# Patient Record
Sex: MALE | Race: WHITE | NOT HISPANIC OR LATINO | Employment: FULL TIME | URBAN - METROPOLITAN AREA
[De-identification: names, ages, dates, MRNs, and addresses within clinical notes are randomized per-mention and may not be internally consistent; named-entity substitution may affect disease eponyms.]

---

## 2023-03-23 LAB
ALANINE AMINOTRANSFERASE (SGPT) (U/L) IN SER/PLAS: 31 U/L (ref 10–52)
ALBUMIN (G/DL) IN SER/PLAS: 4.2 G/DL (ref 3.4–5)
ALKALINE PHOSPHATASE (U/L) IN SER/PLAS: 53 U/L (ref 33–120)
ALPHA 1 ANTITRYPSIN (MG/DL) IN SER/PLAS: 142 MG/DL (ref 84–218)
ANION GAP IN SER/PLAS: 14 MMOL/L (ref 10–20)
ASPARTATE AMINOTRANSFERASE (SGOT) (U/L) IN SER/PLAS: 21 U/L (ref 9–39)
BASOPHILS (10*3/UL) IN BLOOD BY AUTOMATED COUNT: 0.03 X10E9/L (ref 0–0.1)
BASOPHILS/100 LEUKOCYTES IN BLOOD BY AUTOMATED COUNT: 0.4 % (ref 0–2)
BILIRUBIN TOTAL (MG/DL) IN SER/PLAS: 0.6 MG/DL (ref 0–1.2)
C REACTIVE PROTEIN (MG/L) IN SER/PLAS BY HIGH SENSIT: 0.9 MG/L
CALCIDIOL (25 OH VITAMIN D3) (NG/ML) IN SER/PLAS: 34 NG/ML
CALCIUM (MG/DL) IN SER/PLAS: 9 MG/DL (ref 8.6–10.3)
CARBON DIOXIDE, TOTAL (MMOL/L) IN SER/PLAS: 26 MMOL/L (ref 21–32)
CHLORIDE (MMOL/L) IN SER/PLAS: 101 MMOL/L (ref 98–107)
CHOLESTEROL (MG/DL) IN SER/PLAS: 144 MG/DL (ref 0–199)
CHOLESTEROL IN HDL (MG/DL) IN SER/PLAS: 30 MG/DL
CHOLESTEROL/HDL RATIO: 4.8
COBALAMIN (VITAMIN B12) (PG/ML) IN SER/PLAS: 298 PG/ML (ref 211–911)
CREATININE (MG/DL) IN SER/PLAS: 0.91 MG/DL (ref 0.5–1.3)
EOSINOPHILS (10*3/UL) IN BLOOD BY AUTOMATED COUNT: 0.16 X10E9/L (ref 0–0.7)
EOSINOPHILS/100 LEUKOCYTES IN BLOOD BY AUTOMATED COUNT: 2.1 % (ref 0–6)
ERYTHROCYTE DISTRIBUTION WIDTH (RATIO) BY AUTOMATED COUNT: 13.1 % (ref 11.5–14.5)
ERYTHROCYTE MEAN CORPUSCULAR HEMOGLOBIN CONCENTRATION (G/DL) BY AUTOMATED: 32.4 G/DL (ref 32–36)
ERYTHROCYTE MEAN CORPUSCULAR VOLUME (FL) BY AUTOMATED COUNT: 84 FL (ref 80–100)
ERYTHROCYTES (10*6/UL) IN BLOOD BY AUTOMATED COUNT: 5.42 X10E12/L (ref 4.5–5.9)
ESTIMATED AVERAGE GLUCOSE FOR HBA1C: 171 MG/DL
FERRITIN (UG/LL) IN SER/PLAS: 20 UG/L (ref 20–300)
GFR MALE: >90 ML/MIN/1.73M2
GLUCOSE (MG/DL) IN SER/PLAS: 150 MG/DL (ref 74–99)
HEMATOCRIT (%) IN BLOOD BY AUTOMATED COUNT: 45.4 % (ref 41–52)
HEMOGLOBIN (G/DL) IN BLOOD: 14.7 G/DL (ref 13.5–17.5)
HEMOGLOBIN A1C/HEMOGLOBIN TOTAL IN BLOOD: 7.6 %
IMMATURE GRANULOCYTES/100 LEUKOCYTES IN BLOOD BY AUTOMATED COUNT: 0.5 % (ref 0–0.9)
INSULIN, FASTING: 19 UIU/ML (ref 3–25)
IRON (UG/DL) IN SER/PLAS: 48 UG/DL (ref 35–150)
IRON BINDING CAPACITY (UG/DL) IN SER/PLAS: 456 UG/DL (ref 240–445)
IRON SATURATION (%) IN SER/PLAS: 11 % (ref 25–45)
LDL: 59 MG/DL (ref 0–99)
LEUKOCYTES (10*3/UL) IN BLOOD BY AUTOMATED COUNT: 7.7 X10E9/L (ref 4.4–11.3)
LYMPHOCYTES (10*3/UL) IN BLOOD BY AUTOMATED COUNT: 2.04 X10E9/L (ref 1.2–4.8)
LYMPHOCYTES/100 LEUKOCYTES IN BLOOD BY AUTOMATED COUNT: 26.5 % (ref 13–44)
MAGNESIUM (MG/DL) IN SER/PLAS: 2 MG/DL (ref 1.6–2.4)
MONOCYTES (10*3/UL) IN BLOOD BY AUTOMATED COUNT: 0.64 X10E9/L (ref 0.1–1)
MONOCYTES/100 LEUKOCYTES IN BLOOD BY AUTOMATED COUNT: 8.3 % (ref 2–10)
NEUTROPHILS (10*3/UL) IN BLOOD BY AUTOMATED COUNT: 4.79 X10E9/L (ref 1.2–7.7)
NEUTROPHILS/100 LEUKOCYTES IN BLOOD BY AUTOMATED COUNT: 62.2 % (ref 40–80)
NON HDL CHOLESTEROL: 114 MG/DL
PLATELETS (10*3/UL) IN BLOOD AUTOMATED COUNT: 184 X10E9/L (ref 150–450)
POTASSIUM (MMOL/L) IN SER/PLAS: 3.9 MMOL/L (ref 3.5–5.3)
PROSTATE SPECIFIC AG (NG/ML) IN SER/PLAS: 0.29 NG/ML (ref 0–4)
PROTEIN TOTAL: 6.5 G/DL (ref 6.4–8.2)
SODIUM (MMOL/L) IN SER/PLAS: 137 MMOL/L (ref 136–145)
THYROTROPIN (MIU/L) IN SER/PLAS BY DETECTION LIMIT <= 0.05 MIU/L: 2.37 MIU/L (ref 0.44–3.98)
TRIGLYCERIDE (MG/DL) IN SER/PLAS: 273 MG/DL (ref 0–149)
URATE (MG/DL) IN SER/PLAS: 5 MG/DL (ref 4–7.5)
UREA NITROGEN (MG/DL) IN SER/PLAS: 17 MG/DL (ref 6–23)
VLDL: 55 MG/DL (ref 0–40)

## 2023-03-27 LAB — LIPOPROTEIN A SER/PLAS: <6 MG/DL

## 2023-03-28 DIAGNOSIS — Z00.00 HEALTHCARE MAINTENANCE: ICD-10-CM

## 2023-03-29 LAB
TESTOSTERONE FREE (CHAN): 124.1 PG/ML (ref 35–155)
TESTOSTERONE,TOTAL,LC-MS/MS: 564 NG/DL (ref 250–1100)

## 2023-05-25 LAB
CERULOPLASMIN (MG/DL) IN SER/PLAS: 26 MG/DL (ref 20–60)
HEPATITIS A TOTAL AB INTERPRETATION: REACTIVE
HEPATITIS B VIRUS SURFACE AB (MIU/ML) IN SERUM: 8.9 MIU/ML
HEPATITIS B VIRUS SURFACE AG PRESENCE IN SERUM: NONREACTIVE
HEPATITIS C VIRUS AB PRESENCE IN SERUM: NONREACTIVE

## 2023-05-30 LAB
ALPHA-1 ANTITRYPSIN PHENOTYPE: NORMAL
ALPHA-1-ANTITRYPSIN (REF): 142 MG/DL (ref 90–200)

## 2023-06-06 DIAGNOSIS — K76.0 FATTY LIVER DISEASE, NONALCOHOLIC: ICD-10-CM

## 2023-06-30 ENCOUNTER — TELEPHONE (OUTPATIENT)
Dept: PRIMARY CARE | Facility: CLINIC | Age: 58
End: 2023-06-30

## 2023-07-14 LAB
ALANINE AMINOTRANSFERASE (SGPT) (U/L) IN SER/PLAS: 34 U/L (ref 10–52)
ALBUMIN (G/DL) IN SER/PLAS: 4.6 G/DL (ref 3.4–5)
ALBUMIN (MG/L) IN URINE: <7 MG/L
ALBUMIN/CREATININE (UG/MG) IN URINE: NORMAL UG/MG CRT (ref 0–30)
ALKALINE PHOSPHATASE (U/L) IN SER/PLAS: 57 U/L (ref 33–120)
ANION GAP IN SER/PLAS: 15 MMOL/L (ref 10–20)
ASPARTATE AMINOTRANSFERASE (SGOT) (U/L) IN SER/PLAS: 22 U/L (ref 9–39)
BILIRUBIN TOTAL (MG/DL) IN SER/PLAS: 0.7 MG/DL (ref 0–1.2)
CALCIUM (MG/DL) IN SER/PLAS: 9.7 MG/DL (ref 8.6–10.6)
CARBON DIOXIDE, TOTAL (MMOL/L) IN SER/PLAS: 27 MMOL/L (ref 21–32)
CHLORIDE (MMOL/L) IN SER/PLAS: 101 MMOL/L (ref 98–107)
CHOLESTEROL (MG/DL) IN SER/PLAS: 164 MG/DL (ref 0–199)
CHOLESTEROL IN HDL (MG/DL) IN SER/PLAS: 35.8 MG/DL
CHOLESTEROL/HDL RATIO: 4.6
CREATININE (MG/DL) IN SER/PLAS: 0.81 MG/DL (ref 0.5–1.3)
CREATININE (MG/DL) IN URINE: 37 MG/DL (ref 20–370)
GFR MALE: >90 ML/MIN/1.73M2
GLUCOSE (MG/DL) IN SER/PLAS: 166 MG/DL (ref 74–99)
LDL: 72 MG/DL (ref 0–99)
NON HDL CHOLESTEROL: 128 MG/DL
POTASSIUM (MMOL/L) IN SER/PLAS: 4 MMOL/L (ref 3.5–5.3)
PROTEIN TOTAL: 7.2 G/DL (ref 6.4–8.2)
SODIUM (MMOL/L) IN SER/PLAS: 139 MMOL/L (ref 136–145)
TRIGLYCERIDE (MG/DL) IN SER/PLAS: 283 MG/DL (ref 0–149)
UREA NITROGEN (MG/DL) IN SER/PLAS: 18 MG/DL (ref 6–23)
VLDL: 57 MG/DL (ref 0–40)

## 2023-07-18 LAB
CHOLESTEROL, TOTAL(NMR): 173 MG/DL (ref 100–199)
HDL-C(NMR): 35 MG/DL
HDL-P (TOTAL)(NMR): 32.5 UMOL/L
LDL AND HDL PARTICLES -DATA CONVERSION: ABNORMAL
LDL SIZE(NMR): 19.7 NM
LDL-C (NIH CALC)(NMR): 94 MG/DL (ref 0–99)
LDL-P(NMR): 1341 NMOL/L
LIPOPROTEIN A: <6 MG/DL
LP-IR SCORE(NMR): 87
SMALL LDL-P(NMR): 1036 NMOL/L
TRIGLYCERIDES(NMR): 260 MG/DL (ref 0–149)

## 2023-09-26 PROBLEM — E66.01 MORBID OBESITY (MULTI): Status: ACTIVE | Noted: 2023-09-26

## 2023-09-26 PROBLEM — G47.33 OBSTRUCTIVE SLEEP APNEA: Status: ACTIVE | Noted: 2023-09-26

## 2023-09-26 PROBLEM — I10 HIGH BLOOD PRESSURE: Status: ACTIVE | Noted: 2023-09-26

## 2023-09-26 PROBLEM — K74.60 CIRRHOSIS OF LIVER (MULTI): Status: ACTIVE | Noted: 2023-09-26

## 2023-09-26 PROBLEM — I50.30 (HFPEF) HEART FAILURE WITH PRESERVED EJECTION FRACTION (MULTI): Status: ACTIVE | Noted: 2023-09-26

## 2023-09-26 PROBLEM — M26.69 TMJ INFLAMMATION: Status: ACTIVE | Noted: 2023-09-26

## 2023-09-26 PROBLEM — R79.89 LOW SERUM TESTOSTERONE: Status: ACTIVE | Noted: 2023-09-26

## 2023-09-26 PROBLEM — K76.0 FATTY LIVER: Status: ACTIVE | Noted: 2023-09-26

## 2023-09-26 PROBLEM — E11.9 DM TYPE 2 (DIABETES MELLITUS, TYPE 2) (MULTI): Status: ACTIVE | Noted: 2023-09-26

## 2023-09-26 RX ORDER — FLUTICASONE FUROATE AND VILANTEROL TRIFENATATE 100; 25 UG/1; UG/1
1 POWDER RESPIRATORY (INHALATION) DAILY
COMMUNITY
Start: 2022-11-03

## 2023-09-26 RX ORDER — AMLODIPINE AND VALSARTAN 10; 160 MG/1; MG/1
1 TABLET ORAL DAILY
COMMUNITY
Start: 2023-08-12 | End: 2023-11-03 | Stop reason: WASHOUT

## 2023-09-26 RX ORDER — OMEPRAZOLE 20 MG/1
CAPSULE, DELAYED RELEASE ORAL
COMMUNITY
End: 2023-11-03 | Stop reason: WASHOUT

## 2023-09-26 RX ORDER — FUROSEMIDE 40 MG/1
TABLET ORAL
COMMUNITY
End: 2023-11-03 | Stop reason: WASHOUT

## 2023-09-26 RX ORDER — MELATONIN 10 MG
CAPSULE ORAL
COMMUNITY
End: 2023-11-03 | Stop reason: WASHOUT

## 2023-09-26 RX ORDER — LOSARTAN POTASSIUM 100 MG/1
100 TABLET ORAL DAILY
COMMUNITY
End: 2023-11-03 | Stop reason: WASHOUT

## 2023-09-26 RX ORDER — VALSARTAN 160 MG/1
160 TABLET ORAL DAILY
COMMUNITY
Start: 2023-09-08 | End: 2023-11-10 | Stop reason: ALTCHOICE

## 2023-09-26 RX ORDER — MULTIVIT-MIN/FA/LYCOPEN/LUTEIN .4-300-25
TABLET ORAL
COMMUNITY

## 2023-09-26 RX ORDER — BROMPHENIRAMINE MALEATE, PSEUDOEPHEDRINE HYDROCHLORIDE, AND DEXTROMETHORPHAN HYDROBROMIDE 2; 30; 10 MG/5ML; MG/5ML; MG/5ML
SYRUP ORAL
COMMUNITY
Start: 2022-12-27

## 2023-09-26 RX ORDER — CIPROFLOXACIN 0.5 MG/.25ML
SOLUTION/ DROPS AURICULAR (OTIC)
COMMUNITY
Start: 2022-01-19 | End: 2023-11-03 | Stop reason: WASHOUT

## 2023-09-26 RX ORDER — CETIRIZINE HYDROCHLORIDE 10 MG/1
1 TABLET ORAL DAILY
COMMUNITY
Start: 2023-01-07

## 2023-09-26 RX ORDER — OMEPRAZOLE 40 MG/1
CAPSULE, DELAYED RELEASE ORAL
COMMUNITY
Start: 2023-08-25

## 2023-09-26 RX ORDER — MONTELUKAST SODIUM 10 MG/1
10 TABLET ORAL DAILY
COMMUNITY
Start: 2023-05-28

## 2023-09-26 RX ORDER — HYDROXYZINE HYDROCHLORIDE 25 MG/1
25 TABLET, FILM COATED ORAL NIGHTLY PRN
COMMUNITY
Start: 2023-04-22 | End: 2023-11-03 | Stop reason: WASHOUT

## 2023-09-26 RX ORDER — ORAL SEMAGLUTIDE 14 MG/1
14 TABLET ORAL DAILY
COMMUNITY
Start: 2023-08-28 | End: 2023-11-03 | Stop reason: WASHOUT

## 2023-09-26 RX ORDER — METFORMIN HYDROCHLORIDE 500 MG/1
TABLET, EXTENDED RELEASE ORAL
COMMUNITY
Start: 2023-05-28 | End: 2023-11-03 | Stop reason: WASHOUT

## 2023-09-26 RX ORDER — ALUMINUM ZIRCONIUM OCTACHLOROHYDREX GLY 16 G/100G
GEL TOPICAL
COMMUNITY

## 2023-09-26 RX ORDER — ZINC SULFATE 50(220)MG
CAPSULE ORAL
COMMUNITY

## 2023-09-26 RX ORDER — EMPAGLIFLOZIN 25 MG/1
25 TABLET, FILM COATED ORAL DAILY
COMMUNITY
End: 2023-11-03 | Stop reason: WASHOUT

## 2023-09-26 RX ORDER — TRAZODONE HYDROCHLORIDE 50 MG/1
TABLET ORAL
COMMUNITY
Start: 2022-11-03 | End: 2023-11-03 | Stop reason: ALTCHOICE

## 2023-09-26 RX ORDER — DICYCLOMINE HYDROCHLORIDE 10 MG/1
CAPSULE ORAL
COMMUNITY
End: 2023-11-03 | Stop reason: WASHOUT

## 2023-09-26 RX ORDER — ONDANSETRON 4 MG/1
TABLET, FILM COATED ORAL
COMMUNITY

## 2023-09-26 RX ORDER — FUROSEMIDE 20 MG/1
40 TABLET ORAL DAILY
COMMUNITY
Start: 2023-08-26 | End: 2023-11-03 | Stop reason: WASHOUT

## 2023-09-26 RX ORDER — TESTOSTERONE 20.25 MG/1.25G
GEL TOPICAL
COMMUNITY
Start: 2023-05-26

## 2023-09-26 RX ORDER — DICYCLOMINE HYDROCHLORIDE 20 MG/1
20 TABLET ORAL 2 TIMES DAILY PRN
COMMUNITY
Start: 2023-09-01

## 2023-09-26 RX ORDER — ALBUTEROL SULFATE 90 UG/1
AEROSOL, METERED RESPIRATORY (INHALATION)
COMMUNITY

## 2023-09-26 RX ORDER — IBUPROFEN 100 MG/5ML
SUSPENSION, ORAL (FINAL DOSE FORM) ORAL
COMMUNITY

## 2023-09-26 RX ORDER — SPIRONOLACTONE 25 MG/1
25 TABLET ORAL DAILY
COMMUNITY
Start: 2023-09-08 | End: 2024-02-19 | Stop reason: SDUPTHER

## 2023-09-26 RX ORDER — VIT C/E/ZN/COPPR/LUTEIN/ZEAXAN 250MG-90MG
CAPSULE ORAL
COMMUNITY
End: 2023-11-03 | Stop reason: WASHOUT

## 2023-10-03 DIAGNOSIS — E11.9 TYPE 2 DIABETES MELLITUS WITHOUT COMPLICATION, WITHOUT LONG-TERM CURRENT USE OF INSULIN (MULTI): Primary | ICD-10-CM

## 2023-10-04 RX ORDER — TIRZEPATIDE 2.5 MG/.5ML
INJECTION, SOLUTION SUBCUTANEOUS
Qty: 2 ML | Refills: 0 | Status: SHIPPED | OUTPATIENT
Start: 2023-10-04 | End: 2023-11-03 | Stop reason: WASHOUT

## 2023-10-06 ENCOUNTER — TELEMEDICINE (OUTPATIENT)
Dept: BEHAVIORAL HEALTH | Facility: CLINIC | Age: 58
End: 2023-10-06
Payer: COMMERCIAL

## 2023-10-06 DIAGNOSIS — F43.23 ADJUSTMENT DISORDER WITH MIXED ANXIETY AND DEPRESSED MOOD: ICD-10-CM

## 2023-10-06 PROCEDURE — 90837 PSYTX W PT 60 MINUTES: CPT | Performed by: PSYCHOLOGIST

## 2023-10-07 NOTE — PROGRESS NOTES
"  8PM [address] [facility] Psych for Adjustment Dx, Stress (60 MIN, [phone])  Third session. Tough past week. Got COVID shortly after we last met, was working from home. First time got COVID, not feeling well, \"brain fog, headache, coughing.\" Wife also had it but was less symptomatic, having had before. Going to [place] early in morning for business trip. Hopes to have some down/rest time there. Initially wife was considering going, now going alone and given how feeling is OK with that. Didn't have time/energy to reach out to wife's family re. HETAL and to set record straight. HETAL still in rehab facility, would healing, will be going  to nursing home/memory care unit after recovery. Said HETAL's reputation and behavioral disruptions has prevented her from getting into a \"4 star\" facility, will be in \"2 star\" facility. She's reached out to him via text and he's managing distress of this, referring her back to guardian which court decided, especially over her finances. Has elected to take 5-6 months before he has face to face contact with her, somewhat of a reset. Daughter, Marilyn, talked to him. He'd like to see his kids visit HETAL but understands the oldest and youngest having some reservations/fears in visiting HETAL on her on, given her unpredictable behavior. Plans on cleaning out [facility] room, getting her stuff around to send to her new residence. Since they lived together so long, is liking cultivating own living space independent of MIL's influence (e.g., got new dining room table, gave old one that patient, wife, HETAL got together, to his daughter and WALDEMAR). Discussed wife's wanting more intimacy/affection. Patient discussed his lowered libido, has been on testosterone replacement patch for over a year. Also, hx of diabetes. Wife will be critical about lack of affection but then focuses complaint on specialized bed they have, saying she doesn't like it. Patient re-directs her appropriately but doesn't always lead to ok " "resolution. Schedules vary, patient's wife up until 1 or 2 am, patient in bed. Has tried being affectionate/loving when entering/leaving but wife rejecting saying she doesn't like to be kissed in am, wakes her. Difficult to assess extent to which residuals from TBI impact judgement/behavior of wife. Leaves patient feeling he can't do anything right at times. Still, able to see wife is committed to him and he loves her, even when difficult. Considering going away for a weekend. Wife's pain also impacting intimacy/wanting to be touched. Wife is incest survivor. \"Reactionary nature of her impulsiveness.\" Wife also has hx of depression and anxiety. Considering getting back to marriage enrichment through [employer] which they did in distant past and was helpful. Wife now Scientologist adverse. Kids (Erin, Marilyn, Bravo). Wife will be re-tested in Jan (neuropsych?, neurological?) to look at functioning. Next: 2 weeks.    "

## 2023-10-10 ENCOUNTER — PHARMACY VISIT (OUTPATIENT)
Dept: PHARMACY | Facility: CLINIC | Age: 58
End: 2023-10-10
Payer: COMMERCIAL

## 2023-10-10 PROCEDURE — RXMED WILLOW AMBULATORY MEDICATION CHARGE

## 2023-10-14 ENCOUNTER — PHARMACY VISIT (OUTPATIENT)
Dept: PHARMACY | Facility: CLINIC | Age: 58
End: 2023-10-14
Payer: COMMERCIAL

## 2023-10-14 PROCEDURE — RXMED WILLOW AMBULATORY MEDICATION CHARGE

## 2023-10-16 ENCOUNTER — APPOINTMENT (OUTPATIENT)
Dept: PHARMACY | Facility: HOSPITAL | Age: 58
End: 2023-10-16
Payer: COMMERCIAL

## 2023-10-20 ENCOUNTER — TELEMEDICINE (OUTPATIENT)
Dept: BEHAVIORAL HEALTH | Facility: CLINIC | Age: 58
End: 2023-10-20
Payer: COMMERCIAL

## 2023-10-20 DIAGNOSIS — F43.23 ADJUSTMENT DISORDER WITH MIXED ANXIETY AND DEPRESSED MOOD: ICD-10-CM

## 2023-10-20 PROCEDURE — 90837 PSYTX W PT 60 MINUTES: CPT | Performed by: PSYCHOLOGIST

## 2023-10-23 NOTE — PROGRESS NOTES
"6PM [address] [facility] Psych for Adjustment Dx, Stress (60 MIN, [phone])  Virtual. Supportive Therapy. Busy, five conferences over past month. 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 Discussed exchanges from family group text thread with him leaving on trip and indicating that might be delayed in airport with current [place] crisis. Wife is pro [employer], also follows [employer] conspiracy theories. At times is unsure how to handle her beliefs. Tries to validate her feelings/beliefs but asks for \"support/proof\" with sources attached. At times, will rely on distraction so she will not be upset. \"Don't know how to make an unreasonable person reasonable.\" Wife wants to get a gun, wants to stock up provisions, etc. Also had exchanges over wife's mother, wife upset about him moving stuff to memory care center, \"she went ape shit\". Able to say some stuff needed to get done but delayed moving stuff and will hire 2 men and a truck out of [employer] funds. Also, concerned with wife's criticism of him with intimacy. Wife's decision-making, thinking and emotional reactions stronger since head injury. 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 Wife also participated in PTSD program at [place], hx of being incest survivor. Hasn't been actively seeing a psychotherapist for several years. Thinking ahead, wonders about wife's competence re. Decision-making, mental health, judgements. Wants to retire at some point, prepare for future but a lot is up in the air. Wife will threaten divorce, \"goes back to hurt little girl\", didn't have positive role models for relationships in the past. Discussed his wondering how much responsibility he should take for her appts (e.g., driving OT appt to be scheduled). Often caught between kids and wife, tries to be respectful and supportive of wife but also understands how kids feel, not " feeling comfortable spending time with his wife when he's not there. Next: 2 weeks.

## 2023-10-27 ENCOUNTER — TELEMEDICINE (OUTPATIENT)
Dept: PHARMACY | Facility: HOSPITAL | Age: 58
End: 2023-10-27
Payer: COMMERCIAL

## 2023-10-27 DIAGNOSIS — E11.9 TYPE 2 DIABETES MELLITUS WITHOUT COMPLICATION, WITHOUT LONG-TERM CURRENT USE OF INSULIN (MULTI): Primary | ICD-10-CM

## 2023-10-27 ASSESSMENT — ENCOUNTER SYMPTOMS: VISUAL CHANGE: 1

## 2023-10-27 NOTE — ASSESSMENT & PLAN NOTE
Discussed new BP medication could be contributing to his changes in vision.   Patricia does not check BP at home.   Recommended Omron BP cuff for accurate readings.

## 2023-10-27 NOTE — ASSESSMENT & PLAN NOTE
Patient is tolerating mounjaro 5 mg once weekly. He would like to increase dose for cardiometabolic benefits, weight loss is becoming stagnant.   Discussed deferring dosage increase until cleared by ophthalmology (apt on 11/1) due to recent changes in vision.

## 2023-10-27 NOTE — PROGRESS NOTES
[email or web address]    Patient is sent at the request of Jacinto Willis MD for medication management.  My final recommendations will be communicated back to the requesting provider by way of shared medical record.    Subjective   Diabetes  He presents for his follow-up diabetic visit. He has type 2 diabetes mellitus. His disease course has been improving. Associated symptoms include visual change. (New onset, patient describes as dark spots at night and bright spots in the AM. F/U with ophthalmology scheduled on 11/1. Potential for this to be from recent change in BP meds. )       Allergies   Allergen Reactions    Tramadol Hives     Current monitoring regimen:   Patient is using: continuous glucose monitor    CGM Report: patient reports 92% time in range, no other data available during discussion    Adverse Effects:   - N/V when he eats peanut butter and jelly  - No other GI side effects reported    Objective     There were no vitals taken for this visit.    Diabetes Pharmacotherapy:    - synjardy XR 25/1000 mg: one tablet by mouth twice daily   - mounjaro 5 mg once weekly (Friday injections)     Historical Pharmacotherapy:   - rybelsus 14 mg once daily   - metformin 500 mg BID    Secondary Prevention:   - Statin? No  - ACE-I/ARB? Yes  - Aspirin? No    Pertinent PMH Review:  - PMH of Pancreatitis: No  - PMH of Retinopathy: No  - PMH of Urinary Tract Infections: No  - PMH of MTC: No    Labs:   Lab Results   Component Value Date    BILITOT 0.7 07/14/2023    CALCIUM 9.7 07/14/2023    CO2 27 07/14/2023     07/14/2023    CREATININE 0.81 07/14/2023    GLUCOSE 166 (H) 07/14/2023    ALKPHOS 57 07/14/2023    K 4.0 07/14/2023    PROT 7.2 07/14/2023     07/14/2023    AST 22 07/14/2023    ALT 34 07/14/2023    BUN 18 07/14/2023    ANIONGAP 15 07/14/2023    MG 2.00 03/23/2023    ALBUMIN 4.6 07/14/2023    GFRMALE >90 07/14/2023     Lab Results   Component Value Date    TRIG 283 (H) 07/14/2023    CHOL 164  07/14/2023    HDL 35.8 (A) 07/14/2023     Lab Results   Component Value Date    HGBA1C 7.6 (A) 03/23/2023    HGBA1C 7.1 (A) 07/21/2022    HGBA1C 8.2 (A) 01/19/2022     Assessment/Plan   Problem List Items Addressed This Visit       DM type 2 (diabetes mellitus, type 2) (CMS/[facility]) - Primary     Patients diabetes is stable with most recent A1c of 7.6% (Goal < 7%).   Continue synjardy and mounjaro.    Education Provided to Patient:   Defer dosage increase until approved by ophthalmology.   Start checking your BP with omron cuff.   Follow-up: I recommend diabetes care be re-addressed in 1 week to discuss potential for dosage increase of mounjaro.    Margaret Almanza, PharmD    Continue all meds under the continuation of care with the referring provider and clinical pharmacy team.

## 2023-10-30 DIAGNOSIS — E11.9 TYPE 2 DIABETES MELLITUS WITHOUT COMPLICATION, WITHOUT LONG-TERM CURRENT USE OF INSULIN (MULTI): Primary | ICD-10-CM

## 2023-11-01 ENCOUNTER — APPOINTMENT (OUTPATIENT)
Dept: BEHAVIORAL HEALTH | Facility: CLINIC | Age: 58
End: 2023-11-01
Payer: COMMERCIAL

## 2023-11-03 ENCOUNTER — PHARMACY VISIT (OUTPATIENT)
Dept: PHARMACY | Facility: CLINIC | Age: 58
End: 2023-11-03
Payer: COMMERCIAL

## 2023-11-03 ENCOUNTER — TELEMEDICINE (OUTPATIENT)
Dept: PHARMACY | Facility: HOSPITAL | Age: 58
End: 2023-11-03
Payer: COMMERCIAL

## 2023-11-03 ENCOUNTER — TELEMEDICINE (OUTPATIENT)
Dept: BEHAVIORAL HEALTH | Facility: CLINIC | Age: 58
End: 2023-11-03
Payer: COMMERCIAL

## 2023-11-03 DIAGNOSIS — E11.9 TYPE 2 DIABETES MELLITUS WITHOUT COMPLICATION, WITHOUT LONG-TERM CURRENT USE OF INSULIN (MULTI): Primary | ICD-10-CM

## 2023-11-03 DIAGNOSIS — F43.23 ADJUSTMENT DISORDER WITH MIXED ANXIETY AND DEPRESSED MOOD: ICD-10-CM

## 2023-11-03 PROCEDURE — RXMED WILLOW AMBULATORY MEDICATION CHARGE

## 2023-11-03 PROCEDURE — 90837 PSYTX W PT 60 MINUTES: CPT | Performed by: PSYCHOLOGIST

## 2023-11-03 RX ORDER — FAMOTIDINE 20 MG/1
20 TABLET, FILM COATED ORAL DAILY
COMMUNITY

## 2023-11-03 RX ORDER — ACETAMINOPHEN 500 MG
2000 TABLET ORAL DAILY
COMMUNITY

## 2023-11-03 ASSESSMENT — ENCOUNTER SYMPTOMS: VISUAL CHANGE: 1

## 2023-11-03 NOTE — Clinical Note
Patient is endorsing signs/symptoms of sinus infection, likely chronic and unrelated to medication therapy. I did not increase his Mounjaro to 7.5 mg today due to the likely start of another antibiotic. I believe at his visit on 11/10/2023 it would be appropriate to start 7.5 mg. He was still taking rybelsus 14 mg and advised to discontinue his therapy and dispose appopriately.

## 2023-11-03 NOTE — PROGRESS NOTES
[email or web address]    Patient is sent at the request of Jacinto Willis MD for medication management.  My final recommendations will be communicated back to the requesting provider by way of shared medical record.    Subjective   Diabetes  He presents for his follow-up diabetic visit. He has type 2 diabetes mellitus. His disease course has been improving. There are no hypoglycemic associated symptoms. Associated symptoms include visual change. Symptoms are stable. Risk factors for coronary artery disease include diabetes mellitus, hypertension, male sex and obesity. An ACE inhibitor/angiotensin II receptor blocker is being taken. Eye exam is current.     Allergies   Allergen Reactions    Tramadol Hives     Current BG monitoring regimen:   Patient is using: continuous glucose monitor    Current BP Monitoring Regimen:   - Has not purchased a BP monitor   - Discussed purchasing an Omron series 3 and 5 blood pressure monitor    Adverse Effects:   - N/V when he eats peanut butter and jelly  - No other GI side effects reported  - Patient reports nasal congestion, cough, and phlegm within his throat. Does not note any shortness of breath or wheezing. Covid presented and positive 9/17/2023 and out of work through 10/1/2023.   - Phlegm Production: Green/yellow/brown  - ENT appointment today (11/3/2023) at 9:30 am   - Doxycycline course completed approximately one week ago.     Objective     There were no vitals taken for this visit.    Diabetes Pharmacotherapy:    - Synjardy XR 25/1000 mg: one tablet by mouth once daily   - Mounjaro 5 mg once weekly on Fridays (Two doses remaining - One injection for today and 11/10/2023)     Historical Pharmacotherapy:   - Rybelsus 14 mg once daily - Patient reports as of 11/3/2023 that he is still taking Rybelsus 14 mg in combination with Mounjaro 5 mg)  - metformin 500 mg BID    Secondary Prevention:   - Statin? No  - ACE-I/ARB? Yes - Valsartan 160 mg   - Aspirin?  No    Pertinent PMH Review:  - PMH of Pancreatitis: No  - PMH of Retinopathy: No  - PMH of Urinary Tract Infections: No  - PMH of MTC: No    Labs:   Lab Results   Component Value Date    BILITOT 0.7 07/14/2023    CALCIUM 9.7 07/14/2023    CO2 27 07/14/2023     07/14/2023    CREATININE 0.81 07/14/2023    GLUCOSE 166 (H) 07/14/2023    ALKPHOS 57 07/14/2023    K 4.0 07/14/2023    PROT 7.2 07/14/2023     07/14/2023    AST 22 07/14/2023    ALT 34 07/14/2023    BUN 18 07/14/2023    ANIONGAP 15 07/14/2023    MG 2.00 03/23/2023    ALBUMIN 4.6 07/14/2023    GFRMALE >90 07/14/2023     Lab Results   Component Value Date    TRIG 283 (H) 07/14/2023    CHOL 164 07/14/2023    HDL 35.8 (A) 07/14/2023     Component      Latest Ref Rng 7/14/2023   LDL Calculated      0 - 99 mg/dL 72      Lab Results   Component Value Date    HGBA1C 7.6 (A) 03/23/2023    HGBA1C 7.1 (A) 07/21/2022    HGBA1C 8.2 (A) 01/19/2022     Component      Latest Ref Rng 7/14/2023   ALBUMIN (MG/L) IN URINE      Not Established mg/L <7.0    Albumin/Creatine Ratio      0.0 - 30.0 ug/mg crt SEE COMMENT    Creatinine, Urine Random      20.0 - 370.0 mg/dL 37.0      Health Maintenance:   Eye Exam: 11/1/2023 - No noted acute or chronic changes. Patient okay'd for Mounjaro dose increase  Lipid Panel: LDL - 72 mg/dL (Goal < 70 mg/dL)  Urine Albumin: WNL - 7/14/2023    Assessment/Plan   Problem List Items Addressed This Visit       DM type 2 (diabetes mellitus, type 2) (CMS/[facility]) - Primary     Patients diabetes is stable with most recent A1c of 7.6% (Goal < 7%).   Continue:  Synjardy XR 25/1000 mg: Take one tablet by mouth once daily  Mounjaro 5 mg: Inject 5 mg under the skin once weekly  Stop:  Rybelsus 14 mg - Do not take this medication in combination with Mounjaro 5 mg. Advised to dispose of this medication.   Medication list and allergy list reconciled during this encounter   Education Provided to Patient: Disposal of Rybelsus 14 mg and continuation of  Mounjaro/Synjardy   Therapy dosage increase with Mounjaro approved by opthalmology as it was reported patient is experiencing age related changes.  Omron Series 3 and 5 recommended for purchase for blood pressure monitoring. As of today he not started monitoring his BP at home.   Follow-up: 11/10/2023  [facility] follow up: 11/10/2023     Yaron Olivier, PharmD    Continue all meds under the continuation of care with the referring provider and clinical pharmacy team.

## 2023-11-04 NOTE — PROGRESS NOTES
"8PM [address] [facility] for Adjustment Dx, Stress (60 MIN, 805-910)  Virtual. Problems with epic/[employer] virtual platform. Supportive/CBT.  Able to start making home the way he likes, removing HETAL's stuff, getting some furniture to make living space nicer, 5 rooms. Hurt forearm, may get MRI. Did see HETAL and redirected her complaints to her guardian . Will help unpack some more stuff then taking \"[place] vacation.\" Hopes to re-calibrate, taking 6 week break from her. \"Trying to integrate wife back into mix with mom.\" She's also upset with her mother. 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 Discussed levels of prioritization with work-1 elected officials, donors  then regulators, 2 board members, 3 executive team, 4 everything else). Better at time mgt now but still has difficulty keeping everyone happy. Often running late b/c of priorities above but clear with staff that this needs to happen. Difficulty balancing own needs with duties, wondering this being part of cycle, \"plow through it at all costs mentality.\" Discussed some of wife's Trump-related beliefs. Continues to set limits with what he's willing to listen to. Ended up making a few appointments for her. Frustrated wife made appt at time his executive meeting was. Sees her doing more complex things, wonders if manipulative. \"With most people assume positive intent. When she's upset, shell do things out of malice.\" Noted his best hours of productivity ate between 6 and 7 pm and at 2am? HETAL hasn't been in home for 12 months. Weekly routine dinner with kids invited. Daughter who's neuro-divergent often complains of physical ailments. Next: 2 weeks.    "

## 2023-11-09 ENCOUNTER — PHARMACY VISIT (OUTPATIENT)
Dept: PHARMACY | Facility: CLINIC | Age: 58
End: 2023-11-09
Payer: COMMERCIAL

## 2023-11-09 DIAGNOSIS — E11.9 TYPE 2 DIABETES MELLITUS WITHOUT COMPLICATION, WITHOUT LONG-TERM CURRENT USE OF INSULIN (MULTI): Primary | ICD-10-CM

## 2023-11-09 PROCEDURE — RXMED WILLOW AMBULATORY MEDICATION CHARGE

## 2023-11-10 ENCOUNTER — OFFICE VISIT (OUTPATIENT)
Dept: CARDIOLOGY | Facility: HOSPITAL | Age: 58
End: 2023-11-10
Payer: COMMERCIAL

## 2023-11-10 ENCOUNTER — TELEMEDICINE (OUTPATIENT)
Dept: PHARMACY | Facility: HOSPITAL | Age: 58
End: 2023-11-10
Payer: COMMERCIAL

## 2023-11-10 ENCOUNTER — PHARMACY VISIT (OUTPATIENT)
Dept: PHARMACY | Facility: CLINIC | Age: 58
End: 2023-11-10
Payer: COMMERCIAL

## 2023-11-10 ENCOUNTER — LAB (OUTPATIENT)
Dept: LAB | Facility: LAB | Age: 58
End: 2023-11-10
Payer: COMMERCIAL

## 2023-11-10 VITALS
WEIGHT: 315 LBS | HEART RATE: 85 BPM | DIASTOLIC BLOOD PRESSURE: 89 MMHG | SYSTOLIC BLOOD PRESSURE: 158 MMHG | OXYGEN SATURATION: 97 % | BODY MASS INDEX: 40.43 KG/M2 | HEIGHT: 74 IN

## 2023-11-10 DIAGNOSIS — I10 HYPERTENSION, UNSPECIFIED TYPE: Primary | ICD-10-CM

## 2023-11-10 DIAGNOSIS — E11.9 TYPE 2 DIABETES MELLITUS WITHOUT COMPLICATION, WITHOUT LONG-TERM CURRENT USE OF INSULIN (MULTI): Primary | ICD-10-CM

## 2023-11-10 DIAGNOSIS — K76.0 FATTY LIVER DISEASE, NONALCOHOLIC: ICD-10-CM

## 2023-11-10 DIAGNOSIS — Z00.00 HEALTHCARE MAINTENANCE: ICD-10-CM

## 2023-11-10 DIAGNOSIS — E11.9 TYPE 2 DIABETES MELLITUS WITHOUT COMPLICATION, WITHOUT LONG-TERM CURRENT USE OF INSULIN (MULTI): ICD-10-CM

## 2023-11-10 LAB
ALBUMIN SERPL BCP-MCNC: 4.6 G/DL (ref 3.4–5)
ALP SERPL-CCNC: 48 U/L (ref 33–120)
ALT SERPL W P-5'-P-CCNC: 27 U/L (ref 10–52)
ANION GAP SERPL CALC-SCNC: 15 MMOL/L (ref 10–20)
AST SERPL W P-5'-P-CCNC: 18 U/L (ref 9–39)
BILIRUB SERPL-MCNC: 0.7 MG/DL (ref 0–1.2)
BUN SERPL-MCNC: 22 MG/DL (ref 6–23)
CALCIUM SERPL-MCNC: 9.5 MG/DL (ref 8.6–10.6)
CHLORIDE SERPL-SCNC: 97 MMOL/L (ref 98–107)
CHOLEST SERPL-MCNC: 193 MG/DL (ref 0–199)
CHOLESTEROL/HDL RATIO: 4.7
CO2 SERPL-SCNC: 30 MMOL/L (ref 21–32)
CREAT SERPL-MCNC: 0.95 MG/DL (ref 0.5–1.3)
CREAT UR-MCNC: 18.1 MG/DL (ref 20–370)
EST. AVERAGE GLUCOSE BLD GHB EST-MCNC: 143 MG/DL
GFR SERPL CREATININE-BSD FRML MDRD: >90 ML/MIN/1.73M*2
GLUCOSE SERPL-MCNC: 128 MG/DL (ref 74–99)
HBA1C MFR BLD: 6.6 %
HDLC SERPL-MCNC: 40.7 MG/DL
LDLC SERPL CALC-MCNC: 111 MG/DL
MICROALBUMIN UR-MCNC: <7 MG/L
MICROALBUMIN/CREAT UR: ABNORMAL MG/G{CREAT}
NON HDL CHOLESTEROL: 152 MG/DL (ref 0–149)
POTASSIUM SERPL-SCNC: 3.7 MMOL/L (ref 3.5–5.3)
PROT SERPL-MCNC: 6.9 G/DL (ref 6.4–8.2)
SODIUM SERPL-SCNC: 138 MMOL/L (ref 136–145)
TRIGL SERPL-MCNC: 205 MG/DL (ref 0–149)
VLDL: 41 MG/DL (ref 0–40)

## 2023-11-10 PROCEDURE — 3062F POS MACROALBUMINURIA REV: CPT | Performed by: INTERNAL MEDICINE

## 2023-11-10 PROCEDURE — 82043 UR ALBUMIN QUANTITATIVE: CPT

## 2023-11-10 PROCEDURE — 1036F TOBACCO NON-USER: CPT | Performed by: INTERNAL MEDICINE

## 2023-11-10 PROCEDURE — 3044F HG A1C LEVEL LT 7.0%: CPT | Performed by: INTERNAL MEDICINE

## 2023-11-10 PROCEDURE — 99215 OFFICE O/P EST HI 40 MIN: CPT | Performed by: INTERNAL MEDICINE

## 2023-11-10 PROCEDURE — 36415 COLL VENOUS BLD VENIPUNCTURE: CPT

## 2023-11-10 PROCEDURE — 99417 PROLNG OP E/M EACH 15 MIN: CPT | Performed by: INTERNAL MEDICINE

## 2023-11-10 PROCEDURE — 3077F SYST BP >= 140 MM HG: CPT | Performed by: INTERNAL MEDICINE

## 2023-11-10 PROCEDURE — 83090 ASSAY OF HOMOCYSTEINE: CPT

## 2023-11-10 PROCEDURE — 82570 ASSAY OF URINE CREATININE: CPT

## 2023-11-10 PROCEDURE — 3079F DIAST BP 80-89 MM HG: CPT | Performed by: INTERNAL MEDICINE

## 2023-11-10 PROCEDURE — 83036 HEMOGLOBIN GLYCOSYLATED A1C: CPT

## 2023-11-10 PROCEDURE — 80061 LIPID PANEL: CPT

## 2023-11-10 PROCEDURE — 3049F LDL-C 100-129 MG/DL: CPT | Performed by: INTERNAL MEDICINE

## 2023-11-10 PROCEDURE — 83921 ORGANIC ACID SINGLE QUANT: CPT

## 2023-11-10 PROCEDURE — 80053 COMPREHEN METABOLIC PANEL: CPT

## 2023-11-10 PROCEDURE — RXMED WILLOW AMBULATORY MEDICATION CHARGE

## 2023-11-10 RX ORDER — AMLODIPINE AND OLMESARTAN MEDOXOMIL 10; 20 MG/1; MG/1
1 TABLET ORAL DAILY
Qty: 30 TABLET | Refills: 11 | Status: SHIPPED | OUTPATIENT
Start: 2023-11-10 | End: 2024-01-08 | Stop reason: SDUPTHER

## 2023-11-10 ASSESSMENT — ENCOUNTER SYMPTOMS: VISUAL CHANGE: 1

## 2023-11-10 ASSESSMENT — PAIN SCALES - GENERAL: PAINLEVEL: 0-NO PAIN

## 2023-11-10 NOTE — PROGRESS NOTES
Patient is sent at the request of Jacinto Willis MD for medication management.  My final recommendations will be communicated back to the requesting provider by way of shared medical record.    Subjective     Diabetes  He presents for his follow-up diabetic visit. He has type 2 diabetes mellitus. His disease course has been improving. There are no hypoglycemic associated symptoms. Associated symptoms include visual change. Symptoms are stable. Risk factors for coronary artery disease include diabetes mellitus, hypertension, male sex and obesity. An ACE inhibitor/angiotensin II receptor blocker is being taken. Eye exam is current.     Allergies   Allergen Reactions    Dog Dander Shortness of breath    Apple Swelling    Lisinopril Cough     Cough    Tramadol Hives    Trazodone Hcl Unknown    Tree Nuts Swelling     Current BG monitoring regimen:   Patient is using: continuous glucose monitor    Current BP Monitoring Regimen:   - Has not purchased a BP monitor   - Previously discussed purchasing an Omron series 3 and 5 blood pressure monitor    Adverse Effects:   - No reported GI adverse events as of 11/10/2023     Objective     There were no vitals taken for this visit.    Diabetes Pharmacotherapy:    - Synjardy XR 25/1000 mg: Take one tablet by mouth once daily   - Mounjaro 5 mg once weekly on Fridays (4 doses remaining)     Historical Pharmacotherapy:   - Rybelsus 14 mg once daily - Patient reports as of 11/3/2023 that he is still taking Rybelsus 14 mg in combination with Mounjaro 5 mg)  - metformin 500 mg BID    Secondary Prevention:   - Statin? No  - ACE-I/ARB? Yes   - Aspirin? No    Pertinent PMH Review:  - PMH of Pancreatitis: No  - PMH of Retinopathy: No  - PMH of Urinary Tract Infections: No  - PMH of MTC/MEN Type II: No    Labs:   Lab Results   Component Value Date    BILITOT 0.7 11/10/2023    CALCIUM 9.5 11/10/2023    CO2 30 11/10/2023    CL 97 (L) 11/10/2023    CREATININE 0.95 11/10/2023    GLUCOSE 128  (H) 11/10/2023    ALKPHOS 48 11/10/2023    K 3.7 11/10/2023    PROT 6.9 11/10/2023     11/10/2023    AST 18 11/10/2023    ALT 27 11/10/2023    BUN 22 11/10/2023    ANIONGAP 15 11/10/2023    MG 2.00 03/23/2023    ALBUMIN 4.6 11/10/2023    GFRMALE >90 07/14/2023     Lab Results   Component Value Date    TRIG 205 (H) 11/10/2023    CHOL 193 11/10/2023    LDLCALC 111 (H) 11/10/2023    HDL 40.7 11/10/2023     Component      Latest Ref Rng 7/14/2023   LDL Calculated      0 - 99 mg/dL 72      Lab Results   Component Value Date    HGBA1C 6.6 (H) 11/10/2023    HGBA1C 7.6 (A) 03/23/2023    HGBA1C 7.1 (A) 07/21/2022     Component      Latest Ref Rng 7/14/2023 11/10/2023   ALBUMIN (MG/L) IN URINE      Not Established mg/L <7.0     Albumin/Creatine Ratio SEE COMMENT  --    Creatinine, Urine Random      20.0 - 370.0 mg/dL 37.0  18.1 (L)    Albumin, Urine Random      Not established mg/L  <7.0       Health Maintenance:   Eye Exam: 11/1/2023 - No noted acute or chronic changes. Patient okay'd for Mounjaro dose increase  Lipid Panel: LDL - 111 mg/dL (Goal < 70 mg/dL) - LDL increased from 72 mg/dL to 111 mg/dL from 7/14/2023 to 111 mg/dL on 11/10/2023   Urine Albumin: WNL - 11/10/2023    Assessment/Plan   Problem List Items Addressed This Visit       DM type 2 (diabetes mellitus, type 2) (CMS/[facility]) - Primary     Patients diabetes is stable with most recent A1c of 7.6% (Goal < 7%).   Continue:  Synjardy XR 25/1000 mg: Take one tablet by mouth once daily  Mounjaro 5 mg: Inject 5 mg under the skin once weekly  Amlodipine-Olmesartan 10/20 mg: Take one tablet by mouth once daily  Continue:  He is currently on a combination of tirzepatide and Rybelsus at the same time and has been taking both.    We previously discussed discontinuation of Rybelsus therapy.    Patient would like to continue therapy at this time with Mounjaro and Rybelsus, he has noted impressive weight loss to date and off label usage of   Omron Series 3 and 5  recommended for purchase for blood pressure monitoring. As of today he not started monitoring his BP at home.   Follow-up: 12/1/2023  [facility] follow up: Not scheduled yet      Yaron Olivier, PharmD    Continue all meds under the continuation of care with the referring provider and clinical pharmacy team.

## 2023-11-10 NOTE — PROGRESS NOTES
[facility] ([facility])      Reason for Visit: No chief complaint on file. and FOLLOW UP  VISIT [facility]    PROBLEM LIST  1. T2DM/IR. LDL 59; VLDL 55; NHDL 114. On Met+SGLT2i. Echo demonstrates normal LV function. No evidence of diastolic dysfunction. Last HbA1c in March 2023 was 7.6    2. Hypertension. Valsartan and Spironolactone    3. HFPEF. ACC/AHA Stage B. NYYA Class 2.    4. NAFLD. Fibroscan positive for Cirrhosis. FIB-4 was 1.113    5. Hyperlipidemia.  Most recent total cholesterol is 164, HDL of 35.8 and LDL of 72 with triglycerides of 283 obtained in July 14, 2023.  Total LDL particles 1000 341 at that time with small LDL particles of 1036 which are quite elevated.    6. 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 IWMI pattern on EKG    Zero CAC 2022      He is just recovering from an episode of COVID.  He has had 2 recurrences this year and he was concerned about whether this might be related to GLP-1 receptor agonists.    During his last visit we transitioned him to tirzepatide because he had not responded to Rybelsus.  It turns out that he was taking both Rybelsus and tirzepatide at the same time until this was discovered by the pharmacist.      We also switched him over to a combination of an SGLT2 inhibitor with metformin and preference to Jardiance met which she was previously on.  He was provided a continuous glucose monitor during his last visit.      He has not been monitoring his blood pressure as he is not obtain a blood pressure monitor yet.      We had considered a cardiac MRI to assess fibrosis and frequent PVCs with exercise and inferior wall myocardial infarction pattern on his EKG during his last visit.      Exercise Stress Test    Mr. Dodd underwent an exercise stress test in March 2023 and completed 8.7 METS on a Raman protocol. He terminated the test owing to dyspnea. He had frequent PVCs although his ST responses were within normal  limits. There was no angina during stress.    A CRP was normal. A CT cardiac scoring performed in January 2022 was 0. An Lp(a) was normal.        Referring Clinician: No ref. provider found     History of Present Illness: Jimmie Dodd is a 58 y.o. male who presents for a follow-up evaluation in the [email or web address] program.     Past Medical History:  He has no past medical history on file.    Past Surgical History:  He has a past surgical history that includes Other surgical history (01/23/2022); Other surgical history (01/23/2022); Other surgical history (01/23/2022); Other surgical history (01/23/2022); and US guided needle liver biopsy (11/4/2022).    Social History:  He     Outpatient Medications:  Current Outpatient Medications   Medication Instructions    albuterol 90 mcg/actuation inhaler INHALE 2 PUFFS BY MOUTH AS DIRECTED INTO THE LUNGS EVERY 4 HOURS AS NEEDED FOR WHEEZING/SHORTNESS OF BREATH    ascorbic acid (Vitamin C) 1,000 mg tablet Vitamin C 1000 MG Oral Tablet   Refills: 0       Active    Bifidobacterium infantis (Align) 4 mg capsule oral    Breo Ellipta 100-25 mcg/dose inhaler 1 puff, inhalation, Daily    brompheniramine-pseudoeph-DM 2-30-10 mg/5 mL syrup TAKE 10 ML BY MOUTH FOUR TIMES DAILY AS NEEDED.    cetirizine (ZyrTEC) 10 mg tablet 1 tablet, oral, Daily    cholecalciferol (VITAMIN D3) 2,000 Units, oral, Daily    dicyclomine (BENTYL) 20 mg, oral, 2 times daily PRN    empagliflozin-metformin (Synjardy XR) 25-1,000 mg 24 hr tablet TAKE ONE TABLET BY MOUTH ONCE DAILY    famotidine (PEPCID) 20 mg, oral, Daily    montelukast (SINGULAIR) 10 mg, oral, Daily    multivitamin with minerals iron-free (Centrum Silver) oral    omeprazole (PriLOSEC) 40 mg DR capsule TAKE ONE CAPSULE BY MOUTH EVERY DAY FOR 90 DAYS    ondansetron (Zofran) 4 mg tablet TAKE ONE TABLET BY MOUTH EVERY 8 TO 12 HOURS AS NEEDED    spironolactone (ALDACTONE) 25 mg, oral, Daily    testosterone 20.25 mg/1.25 gram (1.62 %) gel in metered-dose  "pump APPLY 1 PUMP PRESS TO ONE UPPER ARM AND SHOULDER AND THEN APPLY 1 PUMP PRESS TO THE OPPOSITE UPPER ARM AND SHOULDER ONCE DAILY IN THE MORNIN    tirzepatide 5 mg/0.5 mL pen injector INJECT 1 PEN (5 MG) UNDER THE SKIN ONCE WEEKLY.    valsartan (DIOVAN) 160 mg, oral, Daily    zinc sulfate (Zincate) 220 (50 Zn) MG capsule oral       Last Recorded Vitals:  There were no vitals filed for this visit.  There is no height or weight on file to calculate BMI.     Physical Examination:  General: Well appearing, well-nourished, in no acute distress.  HEENT: Normocephalic atraumatic, pupils equal and reactive to light, extraocular muscles intact, no conjunctival injection, oropharynx clear without exudates.  Neck: Normal carotid arterial pulses, no arterial bruits, no thyromegaly.  Cardiac: Regular rhythm and normal heart rate.  S1, S2 present and normal.  No murmurs, rubs, or gallops.   Pulmonary: No increased work of breathing, no wheezes or crackles.  GI: Normal bowel sounds, no organomegaly appreciated;  no abdominal bruits.      Laboratory Studies:  Lab Results   Component Value Date    GLUCOSE 166 (H) 07/14/2023    CALCIUM 9.7 07/14/2023     07/14/2023    K 4.0 07/14/2023    CO2 27 07/14/2023     07/14/2023    BUN 18 07/14/2023    CREATININE 0.81 07/14/2023     Lab Results   Component Value Date    CHOL 164 07/14/2023    CHOL 144 03/23/2023    CHOL 137 01/19/2022     Lab Results   Component Value Date    HDL 35.8 (A) 07/14/2023    HDL 30.0 (A) 03/23/2023    HDL 29.8 (A) 01/19/2022     No results found for: \"LDLCALC\"  Lab Results   Component Value Date    TRIG 283 (H) 07/14/2023    TRIG 273 (H) 03/23/2023    TRIG 315 (H) 01/19/2022     No components found for: \"CHOLHDL\"  Lab Results   Component Value Date    HGBA1C 7.6 (A) 03/23/2023    HGBA1C 7.1 (A) 07/21/2022    HGBA1C 8.2 (A) 01/19/2022     Lab Results   Component Value Date    CREATININE 0.81 07/14/2023            Cardiology Tests:    Echo:  No results " found for this or any previous visit from the past 1095 days.      Cath:  No results found for this or any previous visit from the past 1095 days.      Stress Test:  No results found for this or any previous visit from the past 1095 days.      Cardiac Imaging:  CT HEART CALCIUM SCORING WO IV CONTRAST 2/13/2023    CAC of 0    Assessment and Plan:  Jimmie Dodd is a 58 y.o. male who presents for a follow-up evaluation in the [facility] program.    58-year-old with a history of Class 4 Obesity, type 2 diabetes mellitus and hypertension who presents for a follow up evaluation. His diabetes was initially diagnosed in 2020. No diabetes related endorgan damage in the form of retinopathy, neuropathy or nephropathy. He had a coronary calcium score in 2022 which was 0.  His lipids are poorly controlled and he clearly needs to be on an improved regimen for hypertension and diabetes control.  He recently has recovered from 2 episodes of COVID.    PLAN  1. T2D.  He is lost approximately 20 pounds since his last visit and is very pleased with his progress.  We will continue up titration of tirzepatide.  He continues to walk consume fairly regularly and is also thinking hard about resuming his exercise activity despite orthopedic limitations in his lower extremity.  He is currently on a combination of tirzepatide and Rybelsus at the same time and has been taking both.  He clearly had a conversation about the fact that he may need to be on one agent not both.  However the patient would like to continue both for the time being.  He clearly has had very impressive weight loss on the combination and off label usage of both medications at the same time.    2. Hypertension. On Valsartan and Spironolactone.  His blood pressure still range in the 130 to 150 mm systolic range.  We discussed changing his regimen from valsartan to amlodipine/olmesartan 10/20 mg 1 p.o. daily.  We will do so during this visit.    3. Lipids.  His his LDL  particles were elevated during his last visit although given his recent dietary changes and his weight loss we anticipate significant improvement.  He would like to wait to see his numbers prior to beginning a statin given his 0 coronary artery calcium score.  Will need to follow-up on his lipid profile.    4. Follow up in 6 months.    5. Cardiac MRI to assess for fibrosis and in light of PVCs with exercise and an IWMI pattern on his EKG.      There are no Patient Instructions on file for this visit.     Education: Reviewed ‘ABCs’ of diabetes management (respective goals in parentheses):  A1C (<6), blood pressure (<130/80), and cholesterol (LDL <70).  Compliance at present is estimated to be good.   Follow up: 6 month      Jacinto Willis MD, FACC, BAM.  Chief, Cardiovascular Medicine  [facility] [place]  Chief Academic and Scientific Officer  [employer]  Professor of Medicine, Radiology and Biomedical Engineering  [employer]  [place], [place]

## 2023-11-11 LAB — HCYS SERPL-SCNC: 10.85 UMOL/L (ref 5–13.9)

## 2023-11-12 LAB — METHYLMALONATE SERPL-SCNC: 0.12 UMOL/L (ref 0–0.4)

## 2023-11-17 ENCOUNTER — TELEMEDICINE (OUTPATIENT)
Dept: BEHAVIORAL HEALTH | Facility: CLINIC | Age: 58
End: 2023-11-17
Payer: COMMERCIAL

## 2023-11-17 DIAGNOSIS — F43.23 ADJUSTMENT DISORDER WITH MIXED ANXIETY AND DEPRESSED MOOD: ICD-10-CM

## 2023-11-17 PROCEDURE — 90837 PSYTX W PT 60 MINUTES: CPT | Performed by: PSYCHOLOGIST

## 2023-11-18 NOTE — PROGRESS NOTES
"8PM [address]11[address] [facility] for Adjustment Dx, Stress (60 MIN, [phone])  Virtual.  Supportive Therapy. Got covid again, has been sick, went to ER. On prednisone for a week, steroid nasal spray, brain fog, fatigue, sleep disruption. Has had covid twice, different strains, over short period. \"People tell me I've changed\", wondered about this being driven by sick and stressors he's been dealing with. Discussed periods recently, \"giddy\", which is \"atypical\" for him. He's not sure what to make of it. Discussed possible contribution of prednisone, extreme fatigue. Will monitor. Still on antibiotic for several more days. Still involved with HETAL, helped unpack boxes at her new place, MIL accusing wife of taking some items. The memory care facility he spent a lot if time vetting, he's found recently new ownership has happened, and many older staff gone. Persons who he trusted (facility , head of nursing) are no longer there. Has had some contact with unit nurse but is second guessing self regarding decision and wonders about level of care HETAL will receive. Wondering if he should defer to her guardian or stay engaged. \"I have a lot less confidence now..don't understand my cristóbal-legal guardian-what's legal not necessarily what's right.\" Used to people getting back to him as  of his organization, questions when reaches out about [facility], doesn't hear back. Wants to maintain healthy boundaries but is unsure what these should be. Additionally, lady walking out of facility asked why he selected facility vs somewhere like [facility] which made him second guess self even more. Isn't quite sure how guardian will work and HETAL said she could get rid of her after a year but this is unclear. Met with  last week. 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 Questioning this, \"the giddiness\", \"feels like \"mid-life crisis.\" Will " "monitor.  Thinking of kids. Oldest daughter struggled in school, 7 years and didn't complete associates. Daughter is neuro divergent. She tells him all she dealt with growing up, caused a PTSD-like atmosphere that's made it difficult for her to \"not think clearly\" and make it hard to figure out what she wants to be doing with her life. Also, tells him direction  at [employer] gives is important, feels hurt, trying to make efforts over time in his parenting  and providing guidance. \"She's accused me of cutting God out of the picture.\" Discussed how he's only one influence and her not recognizing that doesn't mean he hasn't had impact he tried to. Middle child is one class short of a BA and it's been two years. Other child, wants to gender transition. \"I'm feeling anxiety for them.\" Family (e.g., brother, WALDEMAR) coming in from [place] for Thanksgiving and sister will also be here. Has off most of Thanksgiving week. Next: 2 weeks.  "

## 2023-11-30 DIAGNOSIS — E11.9 TYPE 2 DIABETES MELLITUS WITHOUT COMPLICATION, WITHOUT LONG-TERM CURRENT USE OF INSULIN (MULTI): Primary | ICD-10-CM

## 2023-12-01 ENCOUNTER — PHARMACY VISIT (OUTPATIENT)
Dept: PHARMACY | Facility: CLINIC | Age: 58
End: 2023-12-01
Payer: COMMERCIAL

## 2023-12-01 ENCOUNTER — TELEMEDICINE (OUTPATIENT)
Dept: PHARMACY | Facility: HOSPITAL | Age: 58
End: 2023-12-01
Payer: COMMERCIAL

## 2023-12-01 ENCOUNTER — TELEMEDICINE (OUTPATIENT)
Dept: BEHAVIORAL HEALTH | Facility: CLINIC | Age: 58
End: 2023-12-01
Payer: COMMERCIAL

## 2023-12-01 DIAGNOSIS — E11.9 TYPE 2 DIABETES MELLITUS WITHOUT COMPLICATION, WITHOUT LONG-TERM CURRENT USE OF INSULIN (MULTI): Primary | ICD-10-CM

## 2023-12-01 DIAGNOSIS — F43.23 ADJUSTMENT DISORDER WITH MIXED ANXIETY AND DEPRESSED MOOD: ICD-10-CM

## 2023-12-01 PROCEDURE — RXMED WILLOW AMBULATORY MEDICATION CHARGE

## 2023-12-01 PROCEDURE — 90837 PSYTX W PT 60 MINUTES: CPT | Performed by: PSYCHOLOGIST

## 2023-12-01 RX ORDER — BLOOD-GLUCOSE SENSOR
1 EACH MISCELLANEOUS
Qty: 6 EACH | Refills: 3 | Status: SHIPPED | OUTPATIENT
Start: 2023-12-01 | End: 2023-12-11 | Stop reason: ALTCHOICE

## 2023-12-01 RX ORDER — BLOOD-GLUCOSE SENSOR
EACH MISCELLANEOUS
Qty: 2 EACH | Refills: 11 | Status: SHIPPED | OUTPATIENT
Start: 2023-12-01

## 2023-12-01 RX ORDER — TIRZEPATIDE 7.5 MG/.5ML
7.5 INJECTION, SOLUTION SUBCUTANEOUS
Qty: 2 ML | Refills: 1 | Status: SHIPPED | OUTPATIENT
Start: 2023-12-01 | End: 2023-12-28 | Stop reason: ALTCHOICE

## 2023-12-01 ASSESSMENT — ENCOUNTER SYMPTOMS: VISUAL CHANGE: 1

## 2023-12-01 NOTE — PROGRESS NOTES
Patient is sent at the request of Jacinto Willis MD for medication management.  My final recommendations will be communicated back to the requesting provider by way of shared medical record.    Subjective     Diabetes  He presents for his follow-up diabetic visit. He has type 2 diabetes mellitus. His disease course has been improving. There are no hypoglycemic associated symptoms. Associated symptoms include visual change. Symptoms are stable. Risk factors for coronary artery disease include diabetes mellitus, hypertension, male sex and obesity. An ACE inhibitor/angiotensin II receptor blocker is being taken. Eye exam is current.     Allergies   Allergen Reactions    Dog Dander Shortness of breath    Apple Swelling    Lisinopril Cough     Cough    Tramadol Hives    Trazodone Hcl Unknown    Tree Nuts Swelling     Notes improved sleep schedule    Current BG monitoring regimen:   Patient is using: continuous glucose monitor    CGM not connected to [email or web address] portal    Current BP Monitoring Regimen: Equate Wrist Model  - Has purchased an at BP monitor wrist device  - BP fluctuates throughout the day - Notes 25-30% of readings around 125/80 mmHg (Evening readings)  - Majority of BP readings range between 130-140/   - During our discussion patient noted that he has not properly monitored his blood pressure based on the positional location of his wrist when completing readings. Advised to make sure his wrist is at heart level when monitor his BP moving forward.    Adverse Effects:   - No reported GI adverse events as of 12/1/2023      Current Weight:   12/1/2023 - 317 lb    Objective     There were no vitals taken for this visit.    Diabetes Pharmacotherapy:    - Synjardy XR 25/1000 mg: Take one tablet by mouth once daily   - Mounjaro 5 mg once weekly on Fridays (Final dosage completed today 12/1/2023)  - Rybelsus 14 mg: Take one tablet by mouth once daily (1 month remaining)    Hypertension  Pharmacotherapy:  - Amlodipine/Olmesartan 10/20 mg: Take one tablet by mouth once daily  - Spironolactone 25 mg: Take one tablet by mouth once daily    Historical Pharmacotherapy:   - Metformin 500 mg BID    Secondary Prevention:   - Statin? No  - ACE-I/ARB? Yes   - Aspirin? No    Pertinent PMH Review:  - PMH of Pancreatitis: No  - PMH of Retinopathy: No  - PMH of Urinary Tract Infections: No  - PMH of MTC/MEN Type II: No    Labs:   Lab Results   Component Value Date    BILITOT 0.7 11/10/2023    CALCIUM 9.5 11/10/2023    CO2 30 11/10/2023    CL 97 (L) 11/10/2023    CREATININE 0.95 11/10/2023    GLUCOSE 128 (H) 11/10/2023    ALKPHOS 48 11/10/2023    K 3.7 11/10/2023    PROT 6.9 11/10/2023     11/10/2023    AST 18 11/10/2023    ALT 27 11/10/2023    BUN 22 11/10/2023    ANIONGAP 15 11/10/2023    MG 2.00 03/23/2023    ALBUMIN 4.6 11/10/2023    GFRMALE >90 07/14/2023     Lab Results   Component Value Date    TRIG 205 (H) 11/10/2023    CHOL 193 11/10/2023    LDLCALC 111 (H) 11/10/2023    HDL 40.7 11/10/2023     Component      Latest Ref Rng 7/14/2023   LDL Calculated      0 - 99 mg/dL 72      Lab Results   Component Value Date    HGBA1C 6.6 (H) 11/10/2023    HGBA1C 7.6 (A) 03/23/2023    HGBA1C 7.1 (A) 07/21/2022     Component      Latest Ref Rng 7/14/2023 11/10/2023   ALBUMIN (MG/L) IN URINE      Not Established mg/L <7.0     Albumin/Creatine Ratio SEE COMMENT  --    Creatinine, Urine Random      20.0 - 370.0 mg/dL 37.0  18.1 (L)    Albumin, Urine Random      Not established mg/L  <7.0       Health Maintenance:   Eye Exam: 11/1/2023 - No noted acute or chronic changes. Patient okay'd for Mounjaro dose increase  Lipid Panel: LDL - 111 mg/dL (Goal < 70 mg/dL) - LDL increased from 72 mg/dL to 111 mg/dL from 7/14/2023 to 111 mg/dL on 11/10/2023   Urine Albumin: WNL - 11/10/2023    Assessment/Plan   Problem List Items Addressed This Visit       DM type 2 (diabetes mellitus, type 2) (CMS/HCC) - Primary    Relevant  Medications    tirzepatide (Mounjaro) 7.5 mg/0.5 mL pen injector    blood-glucose sensor (FreeStyle Clayton 3 Sensor) device     Patients diabetes is stable with most recent A1c of 7.6% (Goal < 7%).   Start:   Mounjaro 7.5 mg: Inject 7.5 mg under the skin once weekly  Freestyle Clayton 3 glucose monitor (Monthly Cost $75)  Continue:  Synjardy XR 25/1000 mg: Take one tablet by mouth once daily  Amlodipine-Olmesartan 10/20 mg: Take one tablet by mouth once daily  Rybelsus 14 mg: Take one tablet by mouth once daily  Follow-up: 12/22/2023 at 8:30 am  [facility] follow up: 5/10/2024    Yaron Olivier, PharmD    Continue all meds under the continuation of care with the referring provider and clinical pharmacy team.
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
## 2023-12-03 ENCOUNTER — PHARMACY VISIT (OUTPATIENT)
Dept: PHARMACY | Facility: CLINIC | Age: 58
End: 2023-12-03
Payer: COMMERCIAL

## 2023-12-03 PROCEDURE — RXMED WILLOW AMBULATORY MEDICATION CHARGE

## 2023-12-05 ENCOUNTER — PHARMACY VISIT (OUTPATIENT)
Dept: PHARMACY | Facility: CLINIC | Age: 58
End: 2023-12-05
Payer: COMMERCIAL

## 2023-12-09 PROCEDURE — RXMED WILLOW AMBULATORY MEDICATION CHARGE

## 2023-12-11 PROCEDURE — RXMED WILLOW AMBULATORY MEDICATION CHARGE

## 2023-12-11 RX ORDER — BLOOD-GLUCOSE SENSOR
1 EACH MISCELLANEOUS
Qty: 6 EACH | Refills: 3 | Status: SHIPPED | OUTPATIENT
Start: 2023-12-11

## 2023-12-15 ENCOUNTER — TELEMEDICINE (OUTPATIENT)
Dept: BEHAVIORAL HEALTH | Facility: CLINIC | Age: 58
End: 2023-12-15
Payer: COMMERCIAL

## 2023-12-15 ENCOUNTER — PHARMACY VISIT (OUTPATIENT)
Dept: PHARMACY | Facility: CLINIC | Age: 58
End: 2023-12-15
Payer: COMMERCIAL

## 2023-12-15 DIAGNOSIS — F43.23 ADJUSTMENT DISORDER WITH MIXED ANXIETY AND DEPRESSED MOOD: ICD-10-CM

## 2023-12-15 PROCEDURE — 90837 PSYTX W PT 60 MINUTES: CPT | Performed by: PSYCHOLOGIST
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
## 2023-12-22 ENCOUNTER — TELEMEDICINE (OUTPATIENT)
Dept: PHARMACY | Facility: HOSPITAL | Age: 58
End: 2023-12-22
Payer: COMMERCIAL

## 2023-12-22 DIAGNOSIS — E11.9 TYPE 2 DIABETES MELLITUS WITHOUT COMPLICATION, WITHOUT LONG-TERM CURRENT USE OF INSULIN (MULTI): Primary | ICD-10-CM

## 2023-12-22 ASSESSMENT — ENCOUNTER SYMPTOMS: VISUAL CHANGE: 1

## 2023-12-22 NOTE — PROGRESS NOTES
Patient is sent at the request of Jacinto Willis MD for medication management.  My final recommendations will be communicated back to the requesting provider by way of shared medical record.    Subjective     Diabetes  He presents for his follow-up diabetic visit. He has type 2 diabetes mellitus. His disease course has been improving. There are no hypoglycemic associated symptoms. Associated symptoms include visual change. Symptoms are stable. Risk factors for coronary artery disease include diabetes mellitus, hypertension, male sex and obesity. An ACE inhibitor/angiotensin II receptor blocker is being taken. Eye exam is current.     Allergies   Allergen Reactions    Dog Dander Shortness of breath    Apple Swelling    Lisinopril Cough     Cough    Tramadol Hives    Trazodone Hcl Unknown    Tree Nuts Swelling     Notes improved sleep schedule    Current BG monitoring regimen:   Patient is using: continuous glucose monitor    CGM not connected to [facility] portal    Current BP Monitoring Regimen: Equate Wrist Model  Averaging: Mid-low 120s / mid to upper 70s     Adverse Effects:   - Reports one episode of diarrhea for four days, significant gas, nausea but no reported vomiting  - Diarrhea and gas has resolved   - Worked from home x 1 day then returned to work   - He did receive his shingles vaccine and pneumovax vaccine on 12/17/2023, Mounjaro injection was 12/16/2023  - Noted dry mouth - He did take Dicyclomine during this episode noted above     Current Weight:   12/1/2023 - 317 lb  12/22/2023 - 311 lb     Objective     There were no vitals taken for this visit.    Diabetes Pharmacotherapy:    - Synjardy XR 25/1000 mg: Take one tablet by mouth once daily   - Mounjaro 7.5 mg once weekly on Fridays (Second injection will be completed on 12/23/2023)   - Rybelsus 14 mg: Take one tablet by mouth once daily (7 days remaining in current bottle and a 30 ds of an unopened box)    Hypertension  Pharmacotherapy:  - Amlodipine/Olmesartan 10/20 mg: Take one tablet by mouth once daily  - Spironolactone 25 mg: Take one tablet by mouth once daily    Historical Pharmacotherapy:   - Metformin 500 mg BID    Secondary Prevention:   - Statin? No  - ACE-I/ARB? Yes   - Aspirin? No    Pertinent PMH Review:  - PMH of Pancreatitis: No  - PMH of Retinopathy: No  - PMH of Urinary Tract Infections: No  - PMH of MTC/MEN Type II: No    Labs:   Lab Results   Component Value Date    BILITOT 0.7 11/10/2023    CALCIUM 9.5 11/10/2023    CO2 30 11/10/2023    CL 97 (L) 11/10/2023    CREATININE 0.95 11/10/2023    GLUCOSE 128 (H) 11/10/2023    ALKPHOS 48 11/10/2023    K 3.7 11/10/2023    PROT 6.9 11/10/2023     11/10/2023    AST 18 11/10/2023    ALT 27 11/10/2023    BUN 22 11/10/2023    ANIONGAP 15 11/10/2023    MG 2.00 03/23/2023    ALBUMIN 4.6 11/10/2023    GFRMALE >90 07/14/2023     Lab Results   Component Value Date    TRIG 205 (H) 11/10/2023    CHOL 193 11/10/2023    LDLCALC 111 (H) 11/10/2023    HDL 40.7 11/10/2023     Component      Latest Ref Rng 7/14/2023   LDL Calculated      0 - 99 mg/dL 72      Lab Results   Component Value Date    HGBA1C 6.6 (H) 11/10/2023    HGBA1C 7.6 (A) 03/23/2023    HGBA1C 7.1 (A) 07/21/2022     Component      Latest Ref Rng 7/14/2023 11/10/2023   ALBUMIN (MG/L) IN URINE      Not Established mg/L <7.0     Albumin/Creatine Ratio SEE COMMENT  --    Creatinine, Urine Random      20.0 - 370.0 mg/dL 37.0  18.1 (L)    Albumin, Urine Random      Not established mg/L  <7.0       Health Maintenance:   Eye Exam: 11/1/2023 - No noted acute or chronic changes. Patient okay'd for Mounjaro dose increase  Lipid Panel: LDL - 111 mg/dL (Goal < 70 mg/dL) - LDL increased from 72 mg/dL to 111 mg/dL from 7/14/2023 to 111 mg/dL on 11/10/2023   Urine Albumin: WNL - 11/10/2023    Assessment/Plan   Problem List Items Addressed This Visit       DM type 2 (diabetes mellitus, type 2) (CMS/HCC) - Primary     Patients  diabetes is stable with most recent A1c of 6.6% on 11/10/2023 improved from previous elevation at  7.6% on 3/232/2023. Patient has noted improved blood pressure control since our previous visit on Shiva therapy and improved blood pressure monitoring techinque. He added that he was evaluated for life insurance and was confirmed in both arms to be at goal BP target. At our follow up we will discuss Mounjaro 7.5 versus 10 mg along with discontinuation of Rybelsus. (Goal < 7%).   Continue:   Mounjaro 7.5 mg: Inject 7.5 mg under the skin once weekly  Synjardy XR 25/1000 mg: Take one tablet by mouth once daily  Rybelsus 14 mg: Take one tablet by mouth once daily  Freestyle Clayton 3 glucose monitor (Monthly Cost $75)  Amlodipine-Olmesartan 10/20 mg: Take one tablet by mouth once daily  Spironolactone 25 mg: Take one tablet by mouth once daily   Follow-up: 1/8/2024 at 9:30 am  [facility] follow up: 5/10/2024    Yaron Olivier, PharmD    Continue all meds under the continuation of care with the referring provider and clinical pharmacy team.

## 2023-12-28 DIAGNOSIS — E11.9 TYPE 2 DIABETES MELLITUS WITHOUT COMPLICATION, WITHOUT LONG-TERM CURRENT USE OF INSULIN (MULTI): Primary | ICD-10-CM

## 2023-12-28 PROCEDURE — RXMED WILLOW AMBULATORY MEDICATION CHARGE

## 2023-12-28 RX ORDER — TIRZEPATIDE 10 MG/.5ML
10 INJECTION, SOLUTION SUBCUTANEOUS
Qty: 2 ML | Refills: 2 | Status: SHIPPED | OUTPATIENT
Start: 2023-12-28 | End: 2024-02-19 | Stop reason: SDUPTHER

## 2023-12-28 NOTE — PROGRESS NOTES
Mounjaro 10 mg dose sent to [facility] pharmacy. Voicemail left for patient to begin Mounjaro 10 mg. I will have him discontinue all therapy with Rybelsus 14 mg with this dose titration.

## 2024-01-02 PROCEDURE — RXMED WILLOW AMBULATORY MEDICATION CHARGE

## 2024-01-03 ENCOUNTER — PHARMACY VISIT (OUTPATIENT)
Dept: PHARMACY | Facility: CLINIC | Age: 59
End: 2024-01-03
Payer: COMMERCIAL

## 2024-01-04 ENCOUNTER — PHARMACY VISIT (OUTPATIENT)
Dept: PHARMACY | Facility: CLINIC | Age: 59
End: 2024-01-04
Payer: COMMERCIAL

## 2024-01-05 ENCOUNTER — TELEMEDICINE (OUTPATIENT)
Dept: BEHAVIORAL HEALTH | Facility: CLINIC | Age: 59
End: 2024-01-05
Payer: COMMERCIAL

## 2024-01-05 DIAGNOSIS — F43.23 ADJUSTMENT DISORDER WITH MIXED ANXIETY AND DEPRESSED MOOD: ICD-10-CM

## 2024-01-05 PROCEDURE — 90837 PSYTX W PT 60 MINUTES: CPT | Performed by: PSYCHOLOGIST
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
## 2024-01-08 ENCOUNTER — TELEMEDICINE (OUTPATIENT)
Dept: PHARMACY | Facility: HOSPITAL | Age: 59
End: 2024-01-08
Payer: COMMERCIAL

## 2024-01-08 DIAGNOSIS — E11.9 TYPE 2 DIABETES MELLITUS WITHOUT COMPLICATION, WITHOUT LONG-TERM CURRENT USE OF INSULIN (MULTI): Primary | ICD-10-CM

## 2024-01-08 DIAGNOSIS — I10 HYPERTENSION, UNSPECIFIED TYPE: ICD-10-CM

## 2024-01-08 PROCEDURE — RXMED WILLOW AMBULATORY MEDICATION CHARGE

## 2024-01-08 RX ORDER — AMLODIPINE AND OLMESARTAN MEDOXOMIL 10; 20 MG/1; MG/1
1 TABLET ORAL DAILY
Qty: 90 TABLET | Refills: 3 | Status: SHIPPED | OUTPATIENT
Start: 2024-01-08

## 2024-01-08 ASSESSMENT — ENCOUNTER SYMPTOMS: VISUAL CHANGE: 1

## 2024-01-08 NOTE — PROGRESS NOTES
Patient is sent at the request of Jacinto Willis MD for medication management.  My final recommendations will be communicated back to the requesting provider by way of shared medical record.    Subjective     Diabetes  He presents for his follow-up diabetic visit. He has type 2 diabetes mellitus. His disease course has been improving. There are no hypoglycemic associated symptoms. Associated symptoms include visual change. Symptoms are stable. Risk factors for coronary artery disease include diabetes mellitus, hypertension, male sex and obesity. He is compliant with treatment all of the time. His weight is decreasing steadily. He never participates in exercise. His overall blood glucose range is 130-140 mg/dl. An ACE inhibitor/angiotensin II receptor blocker is being taken. Eye exam is current.     Allergies   Allergen Reactions    Dog Dander Shortness of breath    Apple Swelling    Lisinopril Cough     Cough    Tramadol Hives    Trazodone Hcl Unknown    Tree Nuts Swelling     Notes improved sleep schedule    Current BG monitoring regimen:   Patient is using: continuous glucose monitor - Clayton 3         Current BP Monitoring Regimen: Equate Wrist Model  Averagin/69-80 mmHg   HR:  BPM    Adverse Effects:   - Notes improved GI tolerability without Rybelsus 14 mg therapy  - No longer experiencing dry mouth with Rybelsus discontinuation   - Last stomach upset >1 week ago     Current Weight:   2023 - 317 lb  2023 - 311 lb   2024 - 307 lb    Objective     There were no vitals taken for this visit.    Diabetes Pharmacotherapy:    - Synjardy XR  mg: Take one tablet by mouth once daily   - Mounjaro 7.5 mg once weekly on      Historical Diabetes Pharmacotherapy:   - Rybelsus 14 mg: Take one tablet by mouth once daily   - Metformin 500 mg BID    Hypertension Pharmacotherapy:  - Amlodipine/Olmesartan 10/20 mg: Take one tablet by mouth once daily  - Spironolactone 25 mg: Take one  tablet by mouth once daily    Primary Prevention:   - Statin? No  - ACE-I/ARB? Yes   - Aspirin? No    Pertinent PMH Review:  - PMH of Pancreatitis: No  - PMH of Retinopathy: No  - PMH of Urinary Tract Infections: No  - PMH of MTC/MEN Type II: No    Labs:   Lab Results   Component Value Date    BILITOT 0.7 11/10/2023    CALCIUM 9.5 11/10/2023    CO2 30 11/10/2023    CL 97 (L) 11/10/2023    CREATININE 0.95 11/10/2023    GLUCOSE 128 (H) 11/10/2023    ALKPHOS 48 11/10/2023    K 3.7 11/10/2023    PROT 6.9 11/10/2023     11/10/2023    AST 18 11/10/2023    ALT 27 11/10/2023    BUN 22 11/10/2023    ANIONGAP 15 11/10/2023    MG 2.00 03/23/2023    ALBUMIN 4.6 11/10/2023    GFRMALE >90 07/14/2023     Lab Results   Component Value Date    TRIG 205 (H) 11/10/2023    CHOL 193 11/10/2023    LDLCALC 111 (H) 11/10/2023    HDL 40.7 11/10/2023     Component      Latest Ref Rng 7/14/2023   LDL Calculated      0 - 99 mg/dL 72      Lab Results   Component Value Date    HGBA1C 6.6 (H) 11/10/2023    HGBA1C 7.6 (A) 03/23/2023    HGBA1C 7.1 (A) 07/21/2022     Component      Latest Ref Rng 7/14/2023 11/10/2023   ALBUMIN (MG/L) IN URINE      Not Established mg/L <7.0     Albumin/Creatine Ratio SEE COMMENT  --    Creatinine, Urine Random      20.0 - 370.0 mg/dL 37.0  18.1 (L)    Albumin, Urine Random      Not established mg/L  <7.0       Health Maintenance:   Eye Exam: 11/1/2023 - No noted acute or chronic changes. Patient okay'd for Mounjaro dose increase  Lipid Panel: LDL - 111 mg/dL (Goal < 70 mg/dL) - LDL increased from 72 mg/dL to 111 mg/dL from 7/14/2023 to 111 mg/dL on 11/10/2023   Urine Albumin: WNL - 11/10/2023    Assessment/Plan   Problem List Items Addressed This Visit       DM type 2 (diabetes mellitus, type 2) (CMS/[facility]) - Primary    High blood pressure    Relevant Medications    amlodipine-olmesartan (Shiva) 10-20 mg tablet     Patients diabetes is improved with most recent A1c of 6.6% on 11/10/2023 improved from previous  elevation at 6.6% on 11/10/2023 decreased from previous elevation at 7.6% on 3/23/2023. Glycemic control is well-controlled with average glucose of 134 mg/dL and a TIR of 98% for the reporting period 12/26/2023 to 1/8/2024. Today we are agreeable to titrate to Mounjaro 10 mg from 7.5 mg. I will follow up in 3 weeks to reassess therapy.   Initiate:   Mounjaro 10 mg: Inject 10 mg under the skin once weekly  Continue:   Synjardy XR 25/1000 mg: Take one tablet by mouth once daily  Discontinue:   Rybelsus 14 mg: Take one tablet by mouth once daily  Freestyle Clayton 3 glucose monitor (Monthly Cost $75)  Hypertension Pharmacotherapy:   Amlodipine-Olmesartan 10/20 mg: Take one tablet by mouth once daily  Spironolactone 25 mg: Take one tablet by mouth once daily   Follow-up: 1/29/2024 at 9:30 am  [facility] follow up: 5/10/2024    Yaron Olivier, PharmD    Continue all meds under the continuation of care with the referring provider and clinical pharmacy team.

## 2024-01-12 ENCOUNTER — PHARMACY VISIT (OUTPATIENT)
Dept: PHARMACY | Facility: CLINIC | Age: 59
End: 2024-01-12
Payer: COMMERCIAL

## 2024-01-15 ENCOUNTER — TELEPHONE (OUTPATIENT)
Dept: PHARMACY | Facility: HOSPITAL | Age: 59
End: 2024-01-15
Payer: COMMERCIAL

## 2024-01-15 NOTE — TELEPHONE ENCOUNTER
Patient completed an MRI procedure today. Stated that the Clayton sensor was kept on for the duration of his MRI procedure and that his sensor has still worked comparative to baseline.     The recommendation of the  is to remove the sensor prior to any MRI or CT scans. The exposure of these imaging procedures have not been evaluated on glucose readings. Advised patient that if he experiences fluctuations or spikes in his glycemic control following his MRI to contact [employer] for replacement sensor.

## 2024-01-19 ENCOUNTER — TELEMEDICINE (OUTPATIENT)
Dept: BEHAVIORAL HEALTH | Facility: CLINIC | Age: 59
End: 2024-01-19
Payer: COMMERCIAL

## 2024-01-19 DIAGNOSIS — F43.23 ADJUSTMENT DISORDER WITH MIXED ANXIETY AND DEPRESSED MOOD: ICD-10-CM

## 2024-01-19 PROCEDURE — 90837 PSYTX W PT 60 MINUTES: CPT | Performed by: PSYCHOLOGIST
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
## 2024-01-29 ENCOUNTER — TELEMEDICINE (OUTPATIENT)
Dept: PHARMACY | Facility: HOSPITAL | Age: 59
End: 2024-01-29
Payer: COMMERCIAL

## 2024-01-29 DIAGNOSIS — E11.9 TYPE 2 DIABETES MELLITUS WITHOUT COMPLICATION, WITHOUT LONG-TERM CURRENT USE OF INSULIN (MULTI): Primary | ICD-10-CM

## 2024-01-29 DIAGNOSIS — I10 HYPERTENSION, UNSPECIFIED TYPE: ICD-10-CM

## 2024-01-29 ASSESSMENT — ENCOUNTER SYMPTOMS: VISUAL CHANGE: 1

## 2024-01-29 NOTE — PROGRESS NOTES
Patient is sent at the request of Jacinto Willis MD for medication management.  My final recommendations will be communicated back to the requesting provider by way of shared medical record.    Subjective     Diabetes  He presents for his follow-up diabetic visit. He has type 2 diabetes mellitus. His disease course has been improving. There are no hypoglycemic associated symptoms. Associated symptoms include visual change. Symptoms are stable. Risk factors for coronary artery disease include diabetes mellitus, hypertension, male sex and obesity. He is compliant with treatment all of the time. His weight is decreasing steadily. He never participates in exercise. His overall blood glucose range is 130-140 mg/dl. An ACE inhibitor/angiotensin II receptor blocker is being taken. Eye exam is current.     Allergies   Allergen Reactions    Dog Dander Shortness of breath    Apple Swelling    Lisinopril Cough     Cough    Tramadol Hives    Trazodone Hcl Unknown    Tree Nuts Swelling     Notes improved sleep schedule    Current BG monitoring regimen:   Patient is using: continuous glucose monitor - Clayton 3               Current BP Monitoring Regimen: Equate Wrist Model  Averagin/69-80 mmHg   HR:  BPM    Adverse Effects:   - Patient states difficulty with overall titrationH to Mounjaro 10 mg  - With initial injection of Mounjaro 10 mg stated that he had some diarrhea following the initial injection and was unsure whether it was diet related (Crab rangoons, chinese food)  - Stated he had to clear himself of all belching/flatulence prior to his MRI procedure on 1/15  - There is a notable pattern 48 hours after injection that he experiences severe gas and then 12 hours later then diarrhea  - Notes that there would be an odor that follows with the foods that he consumes   - With the most recent injection he avoided any high carbohydrate, fatty food or fried foods. Thus far the most recent injection has been more  tolerable. (1/29)    Current Weight:   12/1/2023 - 317 lb  12/22/2023 - 311 lb   1/8/2024 - 307 lb    Objective     There were no vitals taken for this visit.    Diabetes Pharmacotherapy:    - Synjardy XR 25/1000 mg: Take one tablet by mouth once daily   - Mounjaro 10 mg: Inject 10 mg under the skin once weekly on Fridays     Historical Diabetes Pharmacotherapy:   - Rybelsus 14 mg: Take one tablet by mouth once daily   - Metformin 500 mg BID    Hypertension Pharmacotherapy:  - Amlodipine/Olmesartan 10/20 mg: Take one tablet by mouth once daily  - Spironolactone 25 mg: Take one tablet by mouth once daily    Primary Prevention:   - Statin? No  - ACE-I/ARB? Yes   - Aspirin? No    Pertinent PMH Review:  - PMH of Pancreatitis: No  - PMH of Retinopathy: No  - PMH of Urinary Tract Infections: No  - PMH of MTC/MEN Type II: No    Labs:   Lab Results   Component Value Date    BILITOT 0.7 11/10/2023    CALCIUM 9.5 11/10/2023    CO2 30 11/10/2023    CL 97 (L) 11/10/2023    CREATININE 0.95 11/10/2023    GLUCOSE 128 (H) 11/10/2023    ALKPHOS 48 11/10/2023    K 3.7 11/10/2023    PROT 6.9 11/10/2023     11/10/2023    AST 18 11/10/2023    ALT 27 11/10/2023    BUN 22 11/10/2023    ANIONGAP 15 11/10/2023    MG 2.00 03/23/2023    ALBUMIN 4.6 11/10/2023    GFRMALE >90 07/14/2023     Lab Results   Component Value Date    TRIG 205 (H) 11/10/2023    CHOL 193 11/10/2023    LDLCALC 111 (H) 11/10/2023    HDL 40.7 11/10/2023     Component      Latest Ref Rng 7/14/2023   LDL Calculated      0 - 99 mg/dL 72      Lab Results   Component Value Date    HGBA1C 6.6 (H) 11/10/2023    HGBA1C 7.6 (A) 03/23/2023    HGBA1C 7.1 (A) 07/21/2022     Component      Latest Ref Rng 7/14/2023 11/10/2023   ALBUMIN (MG/L) IN URINE      Not Established mg/L <7.0     Albumin/Creatine Ratio SEE COMMENT  --    Creatinine, Urine Random      20.0 - 370.0 mg/dL 37.0  18.1 (L)    Albumin, Urine Random      Not established mg/L  <7.0       Health Maintenance:   Eye  Exam: 11/1/2023 - No noted acute or chronic changes. Patient okay'd for Mounjaro dose increase  Lipid Panel: LDL - 111 mg/dL (Goal < 70 mg/dL) - LDL increased from 72 mg/dL to 111 mg/dL from 7/14/2023 to 111 mg/dL on 11/10/2023   Urine Albumin: WNL - 11/10/2023    Assessment/Plan   Problem List Items Addressed This Visit       DM type 2 (diabetes mellitus, type 2) (CMS/[facility]) - Primary    High blood pressure     Patients diabetes is improved with most recent A1c of 6.6% on 11/10/2023 improved from previous elevation at 6.6% on 11/10/2023 decreased from previous elevation at 7.6% on 3/23/2023. Patient has continued to demonstrate appropriate glycemic control and is at goal with his A1c and home glucose monitoring. The notable portion of our encounter today was related to the adverse effects he has experience since titration of Mounjaro to 10 mg weekly. The most experienced adverse effect to date is the gas, both belching and flatulence, that he is experiencing. Noting that both forms may be accompanied by a malodorous smell that is often similar to the foods he has ate. He endorsed desire to continue today at the 10 mg dose as he has experienced minimal diarrhea, no vomiting and no GI distention/upset stomach overall. He has since worked to avoid foods that have triggered the noted side effects including fried foods, oily foods/substances and overall portions. We will continue one more month at 10 mg and if experiencing similar adverse effects will decrease back to 7.5 mg weekly.   Continue:   Mounjaro 10 mg: Inject 10 mg under the skin once weekly  Synjardy XR 25/1000 mg: Take one tablet by mouth once daily  Freestyle Clayton 3 glucose monitor (Monthly Cost $75)  Hypertension Pharmacotherapy:   Amlodipine-Olmesartan 10/20 mg: Take one tablet by mouth once daily  Spironolactone 25 mg: Take one tablet by mouth once daily   Follow-up: 02/19/2024 at 9 am  [facility] follow up: 5/10/2024    Yaron Olivier,  PharmD    Continue all meds under the continuation of care with the referring provider and clinical pharmacy team.

## 2024-02-01 PROCEDURE — RXMED WILLOW AMBULATORY MEDICATION CHARGE

## 2024-02-02 ENCOUNTER — PHARMACY VISIT (OUTPATIENT)
Dept: PHARMACY | Facility: CLINIC | Age: 59
End: 2024-02-02
Payer: COMMERCIAL

## 2024-02-02 ENCOUNTER — TELEMEDICINE (OUTPATIENT)
Dept: BEHAVIORAL HEALTH | Facility: CLINIC | Age: 59
End: 2024-02-02
Payer: COMMERCIAL

## 2024-02-02 DIAGNOSIS — F43.23 ADJUSTMENT DISORDER WITH MIXED ANXIETY AND DEPRESSED MOOD: ICD-10-CM

## 2024-02-02 PROCEDURE — 90837 PSYTX W PT 60 MINUTES: CPT | Performed by: PSYCHOLOGIST
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
## 2024-02-05 ENCOUNTER — PHARMACY VISIT (OUTPATIENT)
Dept: PHARMACY | Facility: CLINIC | Age: 59
End: 2024-02-05
Payer: COMMERCIAL

## 2024-02-06 PROCEDURE — RXMED WILLOW AMBULATORY MEDICATION CHARGE

## 2024-02-09 ENCOUNTER — PHARMACY VISIT (OUTPATIENT)
Dept: PHARMACY | Facility: CLINIC | Age: 59
End: 2024-02-09
Payer: COMMERCIAL

## 2024-02-09 DIAGNOSIS — Z00.00 HEALTH MAINTENANCE EXAMINATION: ICD-10-CM

## 2024-02-16 ENCOUNTER — TELEMEDICINE (OUTPATIENT)
Dept: BEHAVIORAL HEALTH | Facility: CLINIC | Age: 59
End: 2024-02-16
Payer: COMMERCIAL

## 2024-02-16 DIAGNOSIS — F43.23 ADJUSTMENT DISORDER WITH MIXED ANXIETY AND DEPRESSED MOOD: ICD-10-CM

## 2024-02-16 PROCEDURE — 1036F TOBACCO NON-USER: CPT | Performed by: PSYCHOLOGIST

## 2024-02-16 PROCEDURE — 90837 PSYTX W PT 60 MINUTES: CPT | Performed by: PSYCHOLOGIST
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
## 2024-02-19 ENCOUNTER — TELEMEDICINE (OUTPATIENT)
Dept: PHARMACY | Facility: HOSPITAL | Age: 59
End: 2024-02-19
Payer: COMMERCIAL

## 2024-02-19 DIAGNOSIS — E11.9 TYPE 2 DIABETES MELLITUS WITHOUT COMPLICATION, WITHOUT LONG-TERM CURRENT USE OF INSULIN (MULTI): ICD-10-CM

## 2024-02-19 PROCEDURE — RXMED WILLOW AMBULATORY MEDICATION CHARGE

## 2024-02-19 RX ORDER — SPIRONOLACTONE 25 MG/1
25 TABLET ORAL DAILY
Qty: 90 TABLET | Refills: 3 | Status: SHIPPED | OUTPATIENT
Start: 2024-02-19

## 2024-02-19 RX ORDER — TIRZEPATIDE 10 MG/.5ML
10 INJECTION, SOLUTION SUBCUTANEOUS
Qty: 6 ML | Refills: 0 | Status: SHIPPED | OUTPATIENT
Start: 2024-02-19 | End: 2024-04-22 | Stop reason: SDUPTHER

## 2024-02-19 ASSESSMENT — ENCOUNTER SYMPTOMS: VISUAL CHANGE: 1

## 2024-02-19 NOTE — PROGRESS NOTES
"Patient is sent at the request of Jacinto Willis MD for medication management.  My final recommendations will be communicated back to the requesting provider by way of shared medical record.    Subjective     Jimmie Dodd is a 58 y.o. year old male patient with controlled type two diabetes mellitus (A1c 6.6%), fatty liver, class III obesity (41.21 kg/m2), hypertension, SAMPSON, and hyperlipidemia. Patient follows through the [facility]. Today we are following up to discuss his glycemic control since our last visit and overall medication tolerability. Since our previous visit he has completed an endocrinology appointment at an OSH. His vitals at his visit on 2/9/2024 with endocrinology were as follows - /68  Pulse 85  Ht 6' 2\" (1.88 m)  Wt (!) 300 lb 6.4 oz (136 kg)  BMI 38.57 kg/m². His last subjective weight per our discussion was approximately 307 lb. An in office A1c was completed and was noted to be 5.9% with POC A1c.     Diabetes  He presents for his follow-up diabetic visit. He has type 2 diabetes mellitus. His disease course has been improving. There are no hypoglycemic associated symptoms. Associated symptoms include visual change. Symptoms are stable. Risk factors for coronary artery disease include diabetes mellitus, hypertension, male sex and obesity. He is compliant with treatment all of the time. His weight is decreasing steadily. He never participates in exercise. His overall blood glucose range is 130-140 mg/dl. An ACE inhibitor/angiotensin II receptor blocker is being taken. Eye exam is current.     Allergies   Allergen Reactions    Dog Dander Shortness of breath    Apple Swelling    Lisinopril Cough     Cough    Tramadol Hives    Trazodone Hcl Unknown    Tree Nuts Swelling     Current BG monitoring regimen:   Patient is using: continuous glucose monitor - Clayton 3                     Adverse Effects:   - Patient states that he avoids high carbohydrate, fatty food or fried foods 24 " "hours before his injection day  - 60 hours after injection day his adverse effects are gone  - No noted significant belching or abdominal pain     Current Weight:   Reported High: 375 lb  7/14/2023: 345 lb (Baseline visit)  12/1/2023 - 317 lb  12/22/2023 - 311 lb   1/8/2024 - 307 lb  2/9/2024 - 300 lb  Goal: 275 lb    Objective     2/9/2024: Endocrinology at [facility]  /68  Pulse 85  Ht 6' 2\" (1.88 m)  Wt (!) 300 lb 6.4 oz (136 kg)  BMI 38.57 kg/m²     Diabetes Pharmacotherapy:    - Synjardy XR 25/1000 mg: Take one tablet by mouth once daily   - Mounjaro 10 mg: Inject 10 mg under the skin once weekly on Fridays (3 doses remaining at home)    Historical Diabetes Pharmacotherapy:   - Rybelsus 14 mg: Take one tablet by mouth once daily   - Metformin 500 mg BID    Hypertension Pharmacotherapy:  - Amlodipine/Olmesartan 10/20 mg: Take one tablet by mouth once daily  - Spironolactone 25 mg: Take one tablet by mouth once daily    Primary Prevention:   - Statin? No  - ACE-I/ARB? Yes   - Aspirin? No    Pertinent PMH Review:  - PMH of Pancreatitis: No  - PMH of Retinopathy: No  - PMH of Urinary Tract Infections: No  - PMH of MTC/MEN Type II: No    Labs:   Lab Results   Component Value Date    BILITOT 0.7 11/10/2023    CALCIUM 9.5 11/10/2023    CO2 30 11/10/2023    CL 97 (L) 11/10/2023    CREATININE 0.95 11/10/2023    GLUCOSE 128 (H) 11/10/2023    ALKPHOS 48 11/10/2023    K 3.7 11/10/2023    PROT 6.9 11/10/2023     11/10/2023    AST 18 11/10/2023    ALT 27 11/10/2023    BUN 22 11/10/2023    ANIONGAP 15 11/10/2023    MG 2.00 03/23/2023    ALBUMIN 4.6 11/10/2023    GFRMALE >90 07/14/2023     Lab Results   Component Value Date    TRIG 205 (H) 11/10/2023    CHOL 193 11/10/2023    LDLCALC 111 (H) 11/10/2023    HDL 40.7 11/10/2023     Component      Latest Ref Rng 7/14/2023   LDL Calculated      0 - 99 mg/dL 72      Lab Results   Component Value Date    HGBA1C 6.6 (H) 11/10/2023    HGBA1C 7.6 (A) 03/23/2023    " HGBA1C 7.1 (A) 07/21/2022     Component      Latest Ref Rng 7/14/2023 11/10/2023   ALBUMIN (MG/L) IN URINE      Not Established mg/L <7.0     Albumin/Creatine Ratio SEE COMMENT  --    Creatinine, Urine Random      20.0 - 370.0 mg/dL 37.0  18.1 (L)    Albumin, Urine Random      Not established mg/L  <7.0       Health Maintenance:   Eye Exam: 11/1/2023 - No noted acute or chronic changes. Patient okay'd for Mounjaro dose increase  Lipid Panel: LDL - 111 mg/dL (Goal < 70 mg/dL) - LDL increased from 72 mg/dL to 111 mg/dL from 7/14/2023 to 111 mg/dL on 11/10/2023   Urine Albumin: WNL - 11/10/2023    Assessment/Plan   Problem List Items Addressed This Visit       DM type 2 (diabetes mellitus, type 2) (CMS/[facility])    Relevant Medications    tirzepatide (Mounjaro) 10 mg/0.5 mL pen injector     Patients diabetes is improved with most recent A1c of 5.9% on 2/9/2024 (OSH) improved from 6.6% on 11/10/2023 improved from baseline at 7.6% on 3/23/2023. Today we discussed continuation of therapy with Mounjaro 10 mg until next [facility] follow up in May 2024. Patient notes improved tolerability to Mounjaro and displays adequate glycemic control with home monitoring and most recent A1c of 5.9% at [facility]. Blood pressure controlled at that visit as well. He is currently down from 345 lb at baseline (initial [facility] visit) to 300 lb as of 2/9/2024.   Diabetes Pharmacotherapy:   Mounjaro 10 mg: Inject 10 mg under the skin once weekly  Synjardy XR 25/1000 mg: Take one tablet by mouth once daily  Freestyle Clayton 3 glucose monitor (Monthly Cost $75)  Hypertension Pharmacotherapy:   Amlodipine-Olmesartan 10/20 mg: Take one tablet by mouth once daily  Spironolactone 25 mg: Take one tablet by mouth once daily   Only adds salt to hard boiled eggs and cucumber salad  Does not add salt to any other foods  Since adding salt to his foods he notes less cramping  Hyperlipidemia Pharmacotherapy:   Not on statin therapy -  mg/dL on 11/10/2023    According to the ADA and ACC/AHA guidelines, moderate-intensity statin and lifestyle modifications are recommended for all diabetic patients aged 40-75 without contraindication to statin therapy to achieve an LDL goal of less than 70 mg/dL.  Follow-up: 04/1/2024 at 9 am  [facility] follow up: 5/10/2024    Yaron Olivier PharmD    Continue all meds under the continuation of care with the referring provider and clinical pharmacy team.

## 2024-02-22 PROCEDURE — RXMED WILLOW AMBULATORY MEDICATION CHARGE

## 2024-02-27 ENCOUNTER — PHARMACY VISIT (OUTPATIENT)
Dept: PHARMACY | Facility: CLINIC | Age: 59
End: 2024-02-27
Payer: COMMERCIAL

## 2024-03-01 ENCOUNTER — TELEMEDICINE (OUTPATIENT)
Dept: BEHAVIORAL HEALTH | Facility: CLINIC | Age: 59
End: 2024-03-01
Payer: COMMERCIAL

## 2024-03-01 DIAGNOSIS — F43.23 ADJUSTMENT DISORDER WITH MIXED ANXIETY AND DEPRESSED MOOD: ICD-10-CM

## 2024-03-01 PROCEDURE — RXMED WILLOW AMBULATORY MEDICATION CHARGE

## 2024-03-01 PROCEDURE — 90837 PSYTX W PT 60 MINUTES: CPT | Performed by: PSYCHOLOGIST

## 2024-03-01 PROCEDURE — 1036F TOBACCO NON-USER: CPT | Performed by: PSYCHOLOGIST

## 2024-03-02 NOTE — PROGRESS NOTES
"8PM 63093 Indiv Psych for Adjustment Dx, Stress (60 MIN, [phone])  Virtual. Supportive Therapy. Had some tech issues, ended up doing Zoom. Discussed situation with two executives, person who put out ultimatum, not seeming open to accepting situation with co-worker. Frustrated by events and has spent a lot of time trying to remedy. Has \"left sense I don't have control of things at work\" and generated some anxiety. Still not sure what he's going to do. Both parties falling in \"victim\" role, treating each other as \"villins\" and expecting him to be the \"hero\" and resolve. Sees person who declared an ultimatum to walk as not receptive to change or \"forgiving\" and wants other party terminated despite giving a subordinate a PIP that drug on for 4 years. Frustrated that he's paying two executives well and sees \"poor behavior\" on both sides. Daughter transitioning first surgery  and he is anxious about this. Making it clear that the daughter/son needs to be ok with decision and if not, can change mind up to first surgical procedure. Complicated by the news that his oldest daughter said situation at [facility] in [place] (protest at Psychiatric, person who transitioned , colleagues \"lied\" and said person wanted to have service at Psychiatric and colleagues protested, made mockery of situation, e.g., dressing in drag, etc), oldest daughter saying this proves \"this is evil\" and daughter who's transitioning threatening to cut oldest daughter out of life forever. As a father, this upsets him even though he knows he has limited responsibility. Wife's neuropsych testing denied by insurance, various offices passing the martinez, trying to get clarification. Able to terminate  and had to spell out very specific reasons and did. Wife's CPAP not working effectively for her and medical company saying doctor needs to issue change order and doctor office saying medical company needing to do something. Feels caught in " "middle of several situations, trying to calm the christianson. Has been able to start day in meditation room with prayer, also when gets home can do prayer walk at [place], valuing combining physical activity with spirituality. Enjoying few evenings of quietude with wife at home. Reading Vol 1 of Monica, Hope and Don't Worry. Going with daughter tomorrow for an event/service. Realizing at work he's not taking advantage of office space and hasn't in some time, considering taking some items from his prayer room to his office, but \"don't want to convey what I don't want to convey.\" \"What do I need to do to make this a comfortable place to be.\" Lots of traveling over next month and daughter/son having surgery Ap 5. Next: 1 month.   "

## 2024-03-05 ENCOUNTER — HOSPITAL ENCOUNTER (OUTPATIENT)
Dept: CARDIOLOGY | Facility: HOSPITAL | Age: 59
Discharge: HOME | End: 2024-03-05
Payer: COMMERCIAL

## 2024-03-05 ENCOUNTER — PHARMACY VISIT (OUTPATIENT)
Dept: PHARMACY | Facility: CLINIC | Age: 59
End: 2024-03-05
Payer: COMMERCIAL

## 2024-03-05 ENCOUNTER — HOSPITAL ENCOUNTER (OUTPATIENT)
Dept: VASCULAR MEDICINE | Facility: HOSPITAL | Age: 59
Discharge: HOME | End: 2024-03-05
Payer: COMMERCIAL

## 2024-03-05 ENCOUNTER — OFFICE VISIT (OUTPATIENT)
Dept: PRIMARY CARE | Facility: CLINIC | Age: 59
End: 2024-03-05
Payer: COMMERCIAL

## 2024-03-05 ENCOUNTER — NUTRITION (OUTPATIENT)
Dept: PRIMARY CARE | Facility: CLINIC | Age: 59
End: 2024-03-05
Payer: COMMERCIAL

## 2024-03-05 ENCOUNTER — ALLIED HEALTH (OUTPATIENT)
Dept: INTEGRATIVE MEDICINE | Facility: CLINIC | Age: 59
End: 2024-03-05
Payer: COMMERCIAL

## 2024-03-05 ENCOUNTER — HOSPITAL ENCOUNTER (OUTPATIENT)
Dept: RADIOLOGY | Facility: HOSPITAL | Age: 59
Discharge: HOME | End: 2024-03-05
Payer: COMMERCIAL

## 2024-03-05 VITALS
HEART RATE: 80 BPM | WEIGHT: 296 LBS | DIASTOLIC BLOOD PRESSURE: 70 MMHG | OXYGEN SATURATION: 97 % | SYSTOLIC BLOOD PRESSURE: 122 MMHG | BODY MASS INDEX: 39.23 KG/M2 | HEIGHT: 73 IN

## 2024-03-05 VITALS — WEIGHT: 296 LBS | HEIGHT: 73 IN | BODY MASS INDEX: 39.23 KG/M2

## 2024-03-05 DIAGNOSIS — Z00.00 HEALTH MAINTENANCE EXAMINATION: ICD-10-CM

## 2024-03-05 DIAGNOSIS — Z13.6 ENCOUNTER FOR SCREENING FOR CARDIOVASCULAR DISORDERS: ICD-10-CM

## 2024-03-05 DIAGNOSIS — R79.89 LOW TESTOSTERONE: ICD-10-CM

## 2024-03-05 DIAGNOSIS — Z00.00 HEALTHCARE MAINTENANCE: Primary | ICD-10-CM

## 2024-03-05 DIAGNOSIS — Z00.00 HEALTH MAINTENANCE EXAMINATION: Primary | ICD-10-CM

## 2024-03-05 DIAGNOSIS — M54.16 LUMBAR NEURITIS: ICD-10-CM

## 2024-03-05 DIAGNOSIS — M79.671 RIGHT FOOT PAIN: ICD-10-CM

## 2024-03-05 DIAGNOSIS — N52.9 ERECTILE DYSFUNCTION, UNSPECIFIED ERECTILE DYSFUNCTION TYPE: ICD-10-CM

## 2024-03-05 LAB
25(OH)D3 SERPL-MCNC: 52 NG/ML (ref 30–100)
ALBUMIN SERPL BCP-MCNC: 4.5 G/DL (ref 3.4–5)
ALP SERPL-CCNC: 56 U/L (ref 33–120)
ALT SERPL W P-5'-P-CCNC: 20 U/L (ref 10–52)
ANION GAP SERPL CALC-SCNC: 17 MMOL/L (ref 10–20)
AST SERPL W P-5'-P-CCNC: 17 U/L (ref 9–39)
BASOPHILS # BLD AUTO: 0.03 X10*3/UL (ref 0–0.1)
BASOPHILS NFR BLD AUTO: 0.4 %
BILIRUB SERPL-MCNC: 0.3 MG/DL (ref 0–1.2)
BUN SERPL-MCNC: 23 MG/DL (ref 6–23)
CALCIUM SERPL-MCNC: 9.1 MG/DL (ref 8.6–10.3)
CHLORIDE SERPL-SCNC: 102 MMOL/L (ref 98–107)
CHOLEST SERPL-MCNC: 170 MG/DL (ref 0–199)
CHOLESTEROL/HDL RATIO: 4.3
CO2 SERPL-SCNC: 24 MMOL/L (ref 21–32)
CREAT SERPL-MCNC: 0.92 MG/DL (ref 0.5–1.3)
CRP SERPL HS-MCNC: 0.6 MG/L
EGFRCR SERPLBLD CKD-EPI 2021: >90 ML/MIN/1.73M*2
EOSINOPHIL # BLD AUTO: 0.24 X10*3/UL (ref 0–0.7)
EOSINOPHIL NFR BLD AUTO: 3.1 %
ERYTHROCYTE [DISTWIDTH] IN BLOOD BY AUTOMATED COUNT: 12.8 % (ref 11.5–14.5)
EST. AVERAGE GLUCOSE BLD GHB EST-MCNC: 108 MG/DL
FERRITIN SERPL-MCNC: 28 NG/ML (ref 20–300)
GLUCOSE SERPL-MCNC: 101 MG/DL (ref 74–99)
HBA1C MFR BLD: 5.4 %
HCT VFR BLD AUTO: 47.9 % (ref 41–52)
HDLC SERPL-MCNC: 39.3 MG/DL
HGB BLD-MCNC: 15.2 G/DL (ref 13.5–17.5)
IMM GRANULOCYTES # BLD AUTO: 0.03 X10*3/UL (ref 0–0.7)
IMM GRANULOCYTES NFR BLD AUTO: 0.4 % (ref 0–0.9)
INSULIN P FAST SERPL-ACNC: 28 UIU/ML (ref 3–25)
IRON SATN MFR SERPL: 11 % (ref 25–45)
IRON SERPL-MCNC: 51 UG/DL (ref 35–150)
LDLC SERPL CALC-MCNC: 87 MG/DL
LYMPHOCYTES # BLD AUTO: 1.28 X10*3/UL (ref 1.2–4.8)
LYMPHOCYTES NFR BLD AUTO: 16.8 %
MAGNESIUM SERPL-MCNC: 2.2 MG/DL (ref 1.6–2.4)
MCH RBC QN AUTO: 27.8 PG (ref 26–34)
MCHC RBC AUTO-ENTMCNC: 31.7 G/DL (ref 32–36)
MCV RBC AUTO: 88 FL (ref 80–100)
MONOCYTES # BLD AUTO: 0.43 X10*3/UL (ref 0.1–1)
MONOCYTES NFR BLD AUTO: 5.6 %
NEUTROPHILS # BLD AUTO: 5.62 X10*3/UL (ref 1.2–7.7)
NEUTROPHILS NFR BLD AUTO: 73.7 %
NON HDL CHOLESTEROL: 131 MG/DL (ref 0–149)
NRBC BLD-RTO: 0 /100 WBCS (ref 0–0)
PLATELET # BLD AUTO: 189 X10*3/UL (ref 150–450)
POC APPEARANCE, URINE: CLEAR
POC BILIRUBIN, URINE: NEGATIVE
POC BLOOD, URINE: NEGATIVE
POC COLOR, URINE: YELLOW
POC GLUCOSE, URINE: ABNORMAL MG/DL
POC KETONES, URINE: NEGATIVE MG/DL
POC LEUKOCYTES, URINE: NEGATIVE
POC NITRITE,URINE: NEGATIVE
POC PH, URINE: 6 PH
POC PROTEIN, URINE: NEGATIVE MG/DL
POC SPECIFIC GRAVITY, URINE: 1.02
POC UROBILINOGEN, URINE: 0.2 EU/DL
POTASSIUM SERPL-SCNC: 4.1 MMOL/L (ref 3.5–5.3)
PROT SERPL-MCNC: 6.4 G/DL (ref 6.4–8.2)
PSA SERPL-MCNC: 0.59 NG/ML
RBC # BLD AUTO: 5.46 X10*6/UL (ref 4.5–5.9)
SODIUM SERPL-SCNC: 139 MMOL/L (ref 136–145)
TIBC SERPL-MCNC: 470 UG/DL (ref 240–445)
TRIGL SERPL-MCNC: 220 MG/DL (ref 0–149)
TSH SERPL-ACNC: 1.66 MIU/L (ref 0.44–3.98)
UIBC SERPL-MCNC: 419 UG/DL (ref 110–370)
URATE SERPL-MCNC: 4.8 MG/DL (ref 4–7.5)
VIT B12 SERPL-MCNC: 284 PG/ML (ref 211–911)
VLDL: 44 MG/DL (ref 0–40)
WBC # BLD AUTO: 7.6 X10*3/UL (ref 4.4–11.3)

## 2024-03-05 PROCEDURE — 36415 COLL VENOUS BLD VENIPUNCTURE: CPT

## 2024-03-05 PROCEDURE — 84402 ASSAY OF FREE TESTOSTERONE: CPT

## 2024-03-05 PROCEDURE — 84153 ASSAY OF PSA TOTAL: CPT

## 2024-03-05 PROCEDURE — 80061 LIPID PANEL: CPT

## 2024-03-05 PROCEDURE — 83525 ASSAY OF INSULIN: CPT

## 2024-03-05 PROCEDURE — 84443 ASSAY THYROID STIM HORMONE: CPT

## 2024-03-05 PROCEDURE — 3048F LDL-C <100 MG/DL: CPT | Performed by: INTERNAL MEDICINE

## 2024-03-05 PROCEDURE — 93018 CV STRESS TEST I&R ONLY: CPT | Performed by: INTERNAL MEDICINE

## 2024-03-05 PROCEDURE — 83550 IRON BINDING TEST: CPT

## 2024-03-05 PROCEDURE — 82728 ASSAY OF FERRITIN: CPT

## 2024-03-05 PROCEDURE — 71046 X-RAY EXAM CHEST 2 VIEWS: CPT

## 2024-03-05 PROCEDURE — 85025 COMPLETE CBC W/AUTO DIFF WBC: CPT

## 2024-03-05 PROCEDURE — 3044F HG A1C LEVEL LT 7.0%: CPT | Performed by: INTERNAL MEDICINE

## 2024-03-05 PROCEDURE — 86141 C-REACTIVE PROTEIN HS: CPT

## 2024-03-05 PROCEDURE — 82607 VITAMIN B-12: CPT

## 2024-03-05 PROCEDURE — AUDIO AUDIO HEARING TEST: Performed by: INTERNAL MEDICINE

## 2024-03-05 PROCEDURE — 93979 VASCULAR STUDY: CPT

## 2024-03-05 PROCEDURE — 80053 COMPREHEN METABOLIC PANEL: CPT

## 2024-03-05 PROCEDURE — 73630 X-RAY EXAM OF FOOT: CPT | Mod: BILATERAL PROCEDURE | Performed by: RADIOLOGY

## 2024-03-05 PROCEDURE — 83540 ASSAY OF IRON: CPT

## 2024-03-05 PROCEDURE — EXAM4 EXAM 4: Performed by: INTERNAL MEDICINE

## 2024-03-05 PROCEDURE — 93017 CV STRESS TEST TRACING ONLY: CPT

## 2024-03-05 PROCEDURE — 93880 EXTRACRANIAL BILAT STUDY: CPT

## 2024-03-05 PROCEDURE — 83036 HEMOGLOBIN GLYCOSYLATED A1C: CPT

## 2024-03-05 PROCEDURE — 3078F DIAST BP <80 MM HG: CPT | Performed by: INTERNAL MEDICINE

## 2024-03-05 PROCEDURE — 93880 EXTRACRANIAL BILAT STUDY: CPT | Performed by: INTERNAL MEDICINE

## 2024-03-05 PROCEDURE — 84550 ASSAY OF BLOOD/URIC ACID: CPT

## 2024-03-05 PROCEDURE — 83735 ASSAY OF MAGNESIUM: CPT

## 2024-03-05 PROCEDURE — 73630 X-RAY EXAM OF FOOT: CPT | Mod: 50

## 2024-03-05 PROCEDURE — 82172 ASSAY OF APOLIPOPROTEIN: CPT

## 2024-03-05 PROCEDURE — 3074F SYST BP LT 130 MM HG: CPT | Performed by: INTERNAL MEDICINE

## 2024-03-05 PROCEDURE — 71046 X-RAY EXAM CHEST 2 VIEWS: CPT | Mod: FOREIGN READ | Performed by: RADIOLOGY

## 2024-03-05 PROCEDURE — 1036F TOBACCO NON-USER: CPT | Performed by: INTERNAL MEDICINE

## 2024-03-05 PROCEDURE — 82306 VITAMIN D 25 HYDROXY: CPT

## 2024-03-05 PROCEDURE — TIVIS TITMUS VISION: Performed by: INTERNAL MEDICINE

## 2024-03-05 PROCEDURE — 81002 URINALYSIS NONAUTO W/O SCOPE: CPT | Performed by: INTERNAL MEDICINE

## 2024-03-05 PROCEDURE — 93016 CV STRESS TEST SUPVJ ONLY: CPT | Performed by: INTERNAL MEDICINE

## 2024-03-05 PROCEDURE — SMAR2 SMART-UHCSM: Performed by: PHYSICIAN ASSISTANT

## 2024-03-05 PROCEDURE — BOMIX BODY MASS INDEX: Performed by: INTERNAL MEDICINE

## 2024-03-05 PROCEDURE — NUTCO NUTRITION CONSULTATION: Performed by: DIETITIAN, REGISTERED

## 2024-03-05 PROCEDURE — 93979 VASCULAR STUDY: CPT | Performed by: INTERNAL MEDICINE

## 2024-03-05 ASSESSMENT — ENCOUNTER SYMPTOMS
PSYCHIATRIC NEGATIVE: 1
RESPIRATORY NEGATIVE: 1
CONSTITUTIONAL NEGATIVE: 1
ENDOCRINE NEGATIVE: 1
GASTROINTESTINAL NEGATIVE: 1
CARDIOVASCULAR NEGATIVE: 1
EYES NEGATIVE: 1
HEMATOLOGIC/LYMPHATIC NEGATIVE: 1
NEUROLOGICAL NEGATIVE: 1
ALLERGIC/IMMUNOLOGIC NEGATIVE: 1

## 2024-03-05 NOTE — PROGRESS NOTES
Executive Physical         Patient ID: Jimmie Dodd is a 58 y.o. male who presents for Executive Evaluation:    The following report is in reference to your  executive physical examination which was held at [facility] on 03/05/24. Firstly, let me state that it was a pleasure meeting with you and that we appreciate you coming to [facility] for your executive evaluation.    At the time of your evaluation you were feeling well with no significant health concerns.    Weight loss d/t a combination of lifestyle and medication management.    You were not complaining of fever ,chills, headache ,dizziness ,cough, chest pain shortness of breath, palpitations, nausea, vomiting, abdominal pain, loss of appetite, diarrhea, blood in the stools, frequent urination or painful urination.      Review of Systems   Constitutional: Negative.    HENT: Negative.     Eyes: Negative.    Respiratory: Negative.     Cardiovascular: Negative.    Gastrointestinal: Negative.    Endocrine: Negative.    Genitourinary: Negative.    Skin: Negative.    Allergic/Immunologic: Negative.    Neurological: Negative.    Hematological: Negative.    Psychiatric/Behavioral: Negative.     All other systems reviewed and are negative.          Patient Active Problem List   Diagnosis    DM type 2 (diabetes mellitus, type 2) (CMS/HCC)    Fatty liver    High blood pressure    Low serum testosterone    Obstructive sleep apnea    TMJ inflammation    (HFpEF) heart failure with preserved ejection fraction (CMS/HCC)    Cirrhosis of liver (CMS/HCC)    Morbid obesity (CMS/HCC)        Past Surgical History:   Procedure Laterality Date    OTHER SURGICAL HISTORY  01/23/2022    Achilles tendon surgery    OTHER SURGICAL HISTORY  01/23/2022    Carpal tunnel surgery    OTHER SURGICAL HISTORY  01/23/2022    Umbilical hernia repair    OTHER SURGICAL HISTORY  01/23/2022    Knee surgery    US GUIDED NEEDLE LIVER  BIOPSY  11/4/2022    US GUIDED NEEDLE LIVER BIOPSY 11/4/2022        Family History   Problem Relation Name Age of Onset    Hodgkin's lymphoma Mother      Diabetes type II Mother      Hypertension Father      Other (lipid disorder) Father      Aortic aneurysm Other sibling     Other (cardiac disorder) Other grandparent     Colon cancer Other grandmother     Colon cancer Other grandfather                Allergies   Allergen Reactions    Dog Dander Shortness of breath    Apple Swelling    Lisinopril Cough     Cough    Tramadol Hives    Trazodone Hcl Unknown    Tree Nuts Swelling          Current Outpatient Medications:     albuterol 90 mcg/actuation inhaler, INHALE 2 PUFFS BY MOUTH AS DIRECTED INTO THE LUNGS EVERY 4 HOURS AS NEEDED FOR WHEEZING/SHORTNESS OF BREATH, Disp: , Rfl:     amlodipine-olmesartan (Shiva) 10-20 mg tablet, Take 1 tablet by mouth once daily., Disp: 90 tablet, Rfl: 3    ascorbic acid (Vitamin C) 1,000 mg tablet, Vitamin C 1000 MG Oral Tablet  Refills: 0     Active, Disp: , Rfl:     Bifidobacterium infantis (Align) 4 mg capsule, Take by mouth., Disp: , Rfl:     blood-glucose sensor (FreeStyle Clayton 3 Sensor) device, 1 each every 14 (fourteen) days., Disp: 6 each, Rfl: 3    blood-glucose sensor (FreeStyle Clayton 3 Sensor) device, Apply 1 sensor to the back of the upper arm every 14 days. Change sensor every two weeks., Disp: 2 each, Rfl: 11    Breo Ellipta 100-25 mcg/dose inhaler, Inhale 1 puff once daily., Disp: , Rfl:     brompheniramine-pseudoeph-DM 2-30-10 mg/5 mL syrup, TAKE 10 ML BY MOUTH FOUR TIMES DAILY AS NEEDED., Disp: , Rfl:     cetirizine (ZyrTEC) 10 mg tablet, Take 1 tablet (10 mg) by mouth once daily., Disp: , Rfl:     cholecalciferol (Vitamin D3) 50 mcg (2,000 unit) capsule, Take 1 capsule (50 mcg) by mouth early in the morning.., Disp: , Rfl:     dicyclomine (Bentyl) 20 mg tablet, Take 1 tablet (20 mg) by mouth 2 times a day as needed., Disp: , Rfl:     empagliflozin-metformin (Synjardy  "XR) 25-1,000 mg 24 hr tablet, TAKE ONE TABLET BY MOUTH ONCE DAILY, Disp: 90 tablet, Rfl: 2    famotidine (Pepcid) 20 mg tablet, Take 1 tablet (20 mg) by mouth once daily., Disp: , Rfl:     montelukast (Singulair) 10 mg tablet, Take 1 tablet (10 mg) by mouth once daily., Disp: , Rfl:     multivitamin with minerals iron-free (Centrum Silver), Take by mouth., Disp: , Rfl:     omeprazole (PriLOSEC) 40 mg DR capsule, TAKE ONE CAPSULE BY MOUTH EVERY DAY FOR 90 DAYS, Disp: , Rfl:     ondansetron (Zofran) 4 mg tablet, TAKE ONE TABLET BY MOUTH EVERY 8 TO 12 HOURS AS NEEDED, Disp: , Rfl:     spironolactone (Aldactone) 25 mg tablet, Take 1 tablet (25 mg) by mouth once daily., Disp: 90 tablet, Rfl: 3    testosterone 20.25 mg/1.25 gram (1.62 %) gel in metered-dose pump, APPLY 1 PUMP PRESS TO ONE UPPER ARM AND SHOULDER AND THEN APPLY 1 PUMP PRESS TO THE OPPOSITE UPPER ARM AND SHOULDER ONCE DAILY IN THE [facility], Disp: , Rfl:     tirzepatide (Mounjaro) 10 mg/0.5 mL pen injector, Inject 10 mg under the skin 1 (one) time per week., Disp: 6 mL, Rfl: 0    zinc sulfate (Zincate) 220 (50 Zn) MG capsule, Take by mouth., Disp: , Rfl:      Visit Vitals  /70   Pulse 80   Ht 1.842 m (6' 0.5\")   Wt 134 kg (296 lb)   SpO2 97%   BMI 39.59 kg/m²   Smoking Status Never   BSA 2.62 m²        Physical Exam  Constitutional:       Appearance: Normal appearance.   HENT:      Head: Normocephalic and atraumatic.      Right Ear: Tympanic membrane, ear canal and external ear normal.      Left Ear: Tympanic membrane, ear canal and external ear normal.      Nose: Nose normal.      Mouth/Throat:      Mouth: Mucous membranes are moist.   Eyes:      Extraocular Movements: Extraocular movements intact.      Conjunctiva/sclera: Conjunctivae normal.      Pupils: Pupils are equal, round, and reactive to light.   Cardiovascular:      Rate and Rhythm: Normal rate and regular rhythm.      Pulses: Normal pulses.      Heart sounds: Normal heart sounds.   Pulmonary: "      Effort: Pulmonary effort is normal.      Breath sounds: Normal breath sounds.   Abdominal:      General: Abdomen is flat. Bowel sounds are normal.      Palpations: Abdomen is soft.   Genitourinary:     Rectum: Normal.   Musculoskeletal:         General: Normal range of motion.      Cervical back: Normal range of motion.   Skin:     General: Skin is warm.   Neurological:      General: No focal deficit present.      Mental Status: He is alert and oriented to person, place, and time.   Psychiatric:         Mood and Affect: Mood normal.         Behavior: Behavior normal.         Discussion/Summary  In summary you are 58 y.o. male  with a history of Type 2 DM, Hypertension, SAMPSON and hypogonadism.  At the time of your evaluation you were feeling well. You were c/o fatigue, especially toward the evening.    Lab studies including complete blood count, comprehensive metabolic panel, fasting lipid panel, thyroid function, prostate specific antigen (PSA) , Vitamin D, testosterone levels and inflammatory markers were normal.      Cardiology- Normal EKG, CT-Cardiac score, Exercise stress test, Carotid and Abdominal Aorta ultrasound studies.    The 10-year coronary artery disease risk is 12% which is a moderate  risk for heart disease and strokes.     Stage 2 Obesity-Congratulations on your weight loss success. Please continue with your current lifestyle and medication management.    R biceps tendon tear- Follow up with orthopedics re surgical repair.    Hypertension- Goal < 130/80. Currently well controlled. Continue with current Rx    DM2-LCB6M=3.4 ( N< 5.7)-this reflects a marked improvement. Continue with Mounjaro+ Synjardy XR  Rx. Follow up with   [facility] team.    SAMPSON- Stable. Continue with current Rx- Avoid sedatives and alcohol prior to bedtime.Follow up with sleep medicine if daytime fatigue persists/worsens.    Low Testosterone levels/ED-T levels are in the normal range. Recommend urology consult with Dr Carr or   Darius ( [phone]) for further management of Testosterone replacement therapy options.    Skin - Facial rash- ? Inflammatory vs Infectious - Please follow up with dermatology for annual examination.Please use a broad-spectrum sunscreen with an SPF of 30 or higher. Monitor moles for ABCDE ( asymmetry, border irregularity, color changes, diameter> pencil eraser and evolving ). Trial of topical antifungal Rx.    R Lateral foot pain- X-rays with  no evidence of fracture or dislocation. Recommend Orthopedics consult for further evaluation of persistent foot pain. ? MRI R foot/ankle. Please set up an appointment with Dr Vinicius Ingram in orthopedics.( [phone])    L cooccygeal pain/lower extremity pain-? Neurological vs Rheumatological-Recommend EMG/NCS( nerve conduction study)    Cancer screening- you are current with colonoscopy,prostate and lung cancer screening tests.     Vaccine- I would  recommend a shingles (Shingrix) vaccine at age at your earliest convenience.  In addition I recommend a tetanus booster every 10 years to maintain immunity.  Recommend pneumonia vaccine (Prevnar 20) at age  65.  Consider COVID-19 booster and flu vaccine next Fall.     Wellness : Please continue with a balanced diet and a regular physical activity program for at least 150 minutes/week of moderate exercise and 30 minutes/week of resistance/weight training per week.  Try to get 6 to 8 hours of sleep per night.  Please download the calm or headspace tyree from the [employer] Store to assist with stress and sleep management if necessary.    In conclusion,  I wish to thank you for attending the  executive health program at [facility].  I wish you and your family a safe and healthy Spring season.    Please email me at [email or web address] or call me at [phone] if you have any questions pertaining to this report or any other medical concerns.    Be Well    Dr RICARDA Beyer and Tanisha Gardner Master Clinician in  Wellness    Senior Attending Physician , [facility]    Clinical     [employer]    [place], [place]    This note was partially generated using the Dragon voice recognition system.  There may be some incorrect wording ,grammar, spelling or punctuation errors that were not corrected prior to committing the note to the medical record.

## 2024-03-05 NOTE — PROGRESS NOTES
"57 y/o male presents to [facility] for stress management and resilience training in coordination with  [facility]. Lives with wife, Pati. 3 kids- 20, 25, 28.      Goals from last year:  - referrals to therapy  - daily stress management   - sleep routine    Successes:  - Lifestyle changes with medication resulted in improved A1C by 1% in a few months!  - Established care with psychologist- Dr. Tucker- very helpful  - Moved MIL to dementia unit-- \"more peaceful and calm\" at home  - More mental clarity, less daily fatigue   - \"Wrestling down stressors in my life successfully\"-- wife able to drive again-- will have more time to be productive at work  - Remodeled house-- created prayer room in three-seasons room  - Becoming more aware of body and it's needs    Stressors:  - Feels work stress is nothing compared to home stress  - poor sleep  -  youngest child scheduled for first gender reassignment surgery- support by [employer]; grieving process; concern about society's response   - planning for FDC-- how to support health?  - wife's health- TBI, conspiratorial thinking; how to build relationship  - finances and future  - some musculoskeletal injuries    Coping strategies: psychotherapy, meditation, prayer, exercise, reading, sitting by fireplace    Plan:  - when notices stress signals during rumination, take a breath  - sleep hygiene   - eft/tapping- tapping solution   SEE PATIENT INSTRUCTIONS            RECALL 3/2023:  Duties/Schedule:  of [employer]. Saturday- house chores, errands, out to dinner. Sundays- mass, spiritual workshop, dinner with family. M-F, AM- quick, days are \"moderate to extremely stressful;\" variable meetings/topics, no breaks b/t meetings. Working until 6-9p. Evenings- depends on wife's mood. [facility] occasionally when to keep wife company. Hopeful that some meetings will reduce in near future  - Works with a      Time for self-care: walking at the zoo twice daily, " "food prep for the week, wind down before bed, breathing exercise, massage, chiro      Known stressors: level has increased in the last year  - Fatigue  - Memory- this past week, he had two episodes of \"visual confusion\" when looking at a computer, with extreme fatigue described as \"a sensation of almost falling asleep behind the wheel\"-- does not recall any associated red flag s/s; attributes first occurrence to poor sleep; both occurred during stressful meetings--- recommend to discuss with Dr. Cha (message also sent to this provider)  - Mother-in-law- hx bipolar d/o and dementia (anger), fall in 9/22 leading to amputation; unhappy with being placed in SNF  - Wife with TBI (2014)- memory, daily routine is challenging, hx PTSD (emotional dysregulation, poor exec fxn)  - Work volatility- employment challenges, limited candidate pool   - Youngest child  - Empty nest  - Loss of his cat, Slim, last month- very hard to come home without him there     Stress in the body:   - speaking quickly, losing breath  - n/v/d- food sensitivity vs. stress  - dental grinding  - \"looks tense'  - flushing  - migraine- needs to sleep it off  - sleep disruption     Sleep quality: 4/10. Night owl. Averages 4-6 hours per night. Sleep onset- delayed due to anxiety/mind racing. Sleep maintenance- q 2-3 hours for nocturia. Uses cpap. Last 7-9 moths   - Caffeine- coffee at every meal, occ with dinner  - ETOH- none  - Water- 2 gallons- on diuretic and jardiance- constant dry mouth  - Late night eating- cheese its for sour stomach; some late dinners occ     Current stress management strategies: body scan meditation, guided imagery     George is familiar with ANS and stress response. He uses several useful practices. Provided some additional resources.      "

## 2024-03-05 NOTE — PATIENT INSTRUCTIONS
Keep up the good work with prayer, meditation, and spending time in solitude!  Stress management:  When any “alarm signals” go off in your body, remember to STOP:  Stop  Take a breath  Observe your thoughts  Proceed with response or change in thought pattern  Explore alternative ways to activate the vagus nerve:  Emotional freedom technique/tapping- See handout   Brief introduction- https://www.ExecMobile[employer].com/watch?v=XRfLTQjJhp0   Extended practice- https://www.ExecMobile[employer].com/watch?v=Hev4y2v3q6q   “The Tapping Solution” phone rj  “The Tapping Solution” documentary on [employer]  Music therapy:  Humming, singing, playing an instrument, or dancing  Work on Sleep Optimization:  Keep the bed a sacred space for sleep and intimacy only   Create a wind down routine one hour before bed:  Turn off screens and reduce house lighting  Go for a short walk outside  Take a shower or Epsom salt bath  10 minute guided meditation  If you wake up and cannot fall back asleep within 10-15 minutes, get out of bed and do something boring/tiring until you become sleepy.   Avoid all light exposure. Light stimulates the stress hormone, cortisol, and it inhibits the sleep hormone, Melatonin.  Use the 5 Senses grounding technique

## 2024-03-05 NOTE — PATIENT INSTRUCTIONS
1) Your daily protein goal is ~160g per day.  Try to distribute this as evenly as possible throughout the day rather than having an extremely large portion of protein at dinner.  Aim to get 40-50g protein at each of your meals (~7-8oz lean protein).    2) You could take leftovers from dinner as your lunch the next day.  If you are making 16oz of protein at dinner, have 8oz at dinner and take 8oz for lunch the next day.    3) Another quick and easy protein option you could use for lunch, could be [employer] or [employer]  on the weekends.    4) You could also use a pre-made protein drink to have at lunch such as [employer] Pro Elite protein drink which provides 32g protein per bottle and pair it with a small portion of protein such as hardboiled eggs.    5) With having your blood sugar spiking first thing in the morning, try adding high fiber complex carbohydrates to your dinner the night before (~1/2-3/4 cup) to see if this helps prevent that significant spike upon waking.  Examples: beans, sweet potatoes, wild rice.     6) To help with some of the GI symptoms you are experiencing with the Mounjaro medication, try using digestive enzymes ([employer] Digestzymes) taking 1 capsule 10-15 minutes before each meal.  You can also drink ita tea throughout the day.    7) Try incorporating an electrolyte replacement product such as Nuun or LMNT to help optimize hydration.

## 2024-03-05 NOTE — PROGRESS NOTES
"Nutrition Assessment     Reason for Visit:  It was a pleasure speaking with Mr. Dodd again to discuss diet and nutrition as part of his executive physical follow-up.    His past medical history includes: type 2 diabetes, hypertension, IBS    He reports fatigue and tiredness and \"memory lapses\".    He has lost a total of 75lbs.    Anthropometrics:  Anthropometrics  Height: 184.2 cm (6' 0.5\")  Weight: 134 kg (296 lb)  BMI (Calculated): 39.57  DBW: 270lbs       Food And Nutrient Intake:  Food and Nutrient History  Food and Nutrient History: He has lost a total of about 75lbs.  He finally got below 300lbs at the beginning of the year.  He has been wearing a CGM since the summer of last year.  He has enjoyed seeing the data that it provides.  His medication was also adjusted.  He has been on a once a week injection of Mounjaro (currenly on 10mg) along with Synjardy daily.  The medication forces him to focus on lean protein sources or else he will have extremely adverse GI symptoms.  He has started craving fruit salad for his breakfast every morning.  He is also craving cucumber salad made with sour cream.  He often has it with his dinner.  Fluid Intake: Water-8-9 20oz bottles a day; coffee-a pot throughout the day (~8 cups); alcohol-none     He reports having more energy.    Supplements: Vitamin C, Multivitamin, Glucosamine/chondroitin, Allign probiotic, Vitamin D    Food Recall  8:00am: 4 scoops of fruit salad (~2 cups), 10oz cups of vanilla Greek yogurt, 1/3 cup oatmeal  12:00pm: bag of carrots and celery, hardboiled eggs (2-4)  7:00pm: pork loin (16oz), potatoes, mixed vegetables    8:00am: 4 scoops of fruit salad, 10oz of vanilla yogurt, 1/3 cup oatmeal  12:00pm: can of Blanco soup, bag of carrots, hardboiled eggs  7:00pm: chicken breast (16oz), mixed vegetables, Au gratin potatoes    8:00am: 4 scoops of fruit salad, 10oz vanilla yogurt, 1/3 cup oatmeal  12:00pm: mixed salad, bag of carrots and cherry tomatoes, " "hardboiled eggs  7:00pm: chicken (16oz), Au gratin potatoes, turkey soup, veggie      Physical Activities:  Physical Activity  Type of Physical Activity: He is having evaluations done to see if he is having nerve problems.  His primary form of exercise is walking.  He is not doing any strength training.  He does try to incorporate biking outdoors.  He really enjoys hiking, but he is not physically able to do that currently.  He does have a [facility] membership.      Energy Needs  Calculated Energy Needs Using Equations  Height: 184.2 cm (6' 0.5\")  Weight Used for Equation Calculations: 134 kg (296 lb)  [employer] Equation (Overweight or Obese Patients): 2219  Equation Chosen to Use by RD: [employer]  Activity Factor: 1.2  Total Energy Needs: 2665  Calories for Weight Loss: 6518-0972  Protein Needs: 160g/day (0.6g/lb DBW, 270lbs)        Nutrition Diagnosis      Nutrition Diagnosis  Patient has Nutrition Diagnosis: Yes  Diagnosis Status (1): Ongoing  Nutrition Diagnosis 1: Altered nutrition related to laboratory values  Related to (1): dietary and physiological factors  As Evidenced by (1): elevated triglyderides (220mg/dl), elevated fasting glucose (101mg/dl)  Additional Nutrition Diagnosis: Diagnosis 2  Diagnosis Status (2): New  Nutrition Diagnosis 2: Inadequate fiber intake  Related to (2): food- and nutrition-related knowledge deficit regarding appropriate fiber intake  As Evidenced by (2): food recall showing he is not meeting the recommended 35-40g fiber per day    Nutrition Interventions/Recommendations   Food and Nutrition Delivery  Meals & Snacks: Fiber-modified diet, Protein-modified diet        Patient Instructions   1) Your daily protein goal is ~160g per day.  Try to distribute this as evenly as possible throughout the day rather than having an extremely large portion of protein at dinner.  Aim to get 40-50g protein at each of your meals (~7-8oz lean protein).    2) You could take leftovers from " dinner as your lunch the next day.  If you are making 16oz of protein at dinner, have 8oz at dinner and take 8oz for lunch the next day.    3) Another quick and easy protein option you could use for lunch, could be [employer] meals or batch  on the weekends.    4) You could also use a pre-made protein drink to have at lunch such as [employer] Pro Elite protein drink which provides 32g protein per bottle and pair it with a small portion of protein such as hardboiled eggs.    5) With having your blood sugar spiking first thing in the morning, try adding high fiber complex carbohydrates to your dinner the night before (~1/2-3/4 cup) to see if this helps prevent that significant spike upon waking.  Examples: beans, sweet potatoes, wild rice.     6) To help with some of the GI symptoms you are experiencing with the Mounjaro medication, try using digestive enzymes ([employer] Digestzymes) taking 1 capsule 10-15 minutes before each meal.  You can also drink ita tea throughout the day.    7) Try incorporating an electrolyte replacement product such as Nuun or LMNT to help optimize hydration.    Nutrition Monitoring and Evaluation   Food/Nutrient Related History Monitoring  Monitoring and Evaluation Plan: Protein intake, Fiber intake  Estimated protein intake: Estimated protein intake  Criteria: 160g per day  Estimated fiber intake: Estimated fiber intake  Criteria: 35-40g per day  Biochemical Data, Medical Tests and Procedures  Monitoring and Evaluation Plan: Glucose/endocrine profile  Glucose/Endocrine Profile: Hemoglobin A1c (HgbA1c), Glucose, fasting

## 2024-03-06 LAB — LPA SERPL-MCNC: <6 MG/DL

## 2024-03-10 LAB
TESTOSTERONE FREE (CHAN): 77 PG/ML (ref 35–155)
TESTOSTERONE,TOTAL,LC-MS/MS: 416 NG/DL (ref 250–1100)

## 2024-03-27 PROCEDURE — RXMED WILLOW AMBULATORY MEDICATION CHARGE

## 2024-03-28 ENCOUNTER — PHARMACY VISIT (OUTPATIENT)
Dept: PHARMACY | Facility: CLINIC | Age: 59
End: 2024-03-28
Payer: COMMERCIAL

## 2024-03-29 ENCOUNTER — TELEMEDICINE (OUTPATIENT)
Dept: BEHAVIORAL HEALTH | Facility: CLINIC | Age: 59
End: 2024-03-29
Payer: COMMERCIAL

## 2024-03-29 DIAGNOSIS — F43.23 ADJUSTMENT DISORDER WITH MIXED ANXIETY AND DEPRESSED MOOD: ICD-10-CM

## 2024-03-29 PROCEDURE — RXMED WILLOW AMBULATORY MEDICATION CHARGE

## 2024-03-29 PROCEDURE — 90837 PSYTX W PT 60 MINUTES: CPT | Performed by: PSYCHOLOGIST

## 2024-03-29 PROCEDURE — 3048F LDL-C <100 MG/DL: CPT | Performed by: PSYCHOLOGIST

## 2024-03-29 PROCEDURE — 3044F HG A1C LEVEL LT 7.0%: CPT | Performed by: PSYCHOLOGIST

## 2024-03-31 NOTE — PROGRESS NOTES
"8PM [address] [facility], Stress (60 MIN, 349-365)  Virtual. Supportive Therapy. Spent most of session discussing his feeling exhausted. A few weeks ago, a Komodo dragon hurt an employee, leaving patient to deal with the fall out. Multiple safety procedures by employee not followed, significant injury. Zoo was cleared by [employer]. A lot of blood by employee spilled, emergency services there, lots of employees impacted by what they saw. Got private group that specializes in this type of trauma to help. Also, started getting media attention he fielded. Frustrated and sad about whole situation. 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 Someone filed a \"red flag\" report to the board, and board chair reached out to him. Feels it came from disgruntled employee. He's confident in his following process of just culture how the conflict between employees was handled. Feeling angry. Next: 2 weeks.   "

## 2024-04-01 ENCOUNTER — PHARMACY VISIT (OUTPATIENT)
Dept: PHARMACY | Facility: CLINIC | Age: 59
End: 2024-04-01
Payer: COMMERCIAL

## 2024-04-01 ENCOUNTER — TELEMEDICINE (OUTPATIENT)
Dept: PHARMACY | Facility: HOSPITAL | Age: 59
End: 2024-04-01
Payer: COMMERCIAL

## 2024-04-01 DIAGNOSIS — E11.9 TYPE 2 DIABETES MELLITUS WITHOUT COMPLICATION, WITHOUT LONG-TERM CURRENT USE OF INSULIN (MULTI): Primary | ICD-10-CM

## 2024-04-01 DIAGNOSIS — I10 HYPERTENSION, UNSPECIFIED TYPE: ICD-10-CM

## 2024-04-01 PROCEDURE — RXMED WILLOW AMBULATORY MEDICATION CHARGE

## 2024-04-01 ASSESSMENT — ENCOUNTER SYMPTOMS: VISUAL CHANGE: 1

## 2024-04-01 NOTE — PROGRESS NOTES
Patient is sent at the request of Jacinto Willis MD for medication management.  My final recommendations will be communicated back to the requesting provider by way of shared medical record.    Subjective     Jimmie Dodd is a 58 y.o. year old male patient with controlled type two diabetes mellitus (A1c 5.4%), fatty liver, class II obesity (39.59  kg/m2), hypertension, SAMPSON, and hyperlipidemia. Patient follows through the [facility]. George completed updated labs on 3/5/2024 including a follow-up annual visit with his PCP. BMI has improved from class III to class II, hypertension well-controlled, A1c improved again to 5.4%, LDL improved to 84 mg/dL. Today we are following up to review current medication therapy.    Diabetes  He presents for his follow-up diabetic visit. He has type 2 diabetes mellitus. His disease course has been improving. There are no hypoglycemic associated symptoms. Associated symptoms include visual change. Symptoms are stable. Risk factors for coronary artery disease include diabetes mellitus, hypertension, male sex and obesity. He is compliant with treatment all of the time. His weight is decreasing steadily. He never participates in exercise. His overall blood glucose range is 130-140 mg/dl. An ACE inhibitor/angiotensin II receptor blocker is being taken. Eye exam is current.     Allergies   Allergen Reactions    Dog Dander Shortness of breath    Apple Swelling    Lisinopril Cough     Cough    Tramadol Hives    Trazodone Hcl Unknown    Tree Nuts Swelling     Current BG monitoring regimen:   Patient is using: continuous glucose monitor - Clayton 3                         Adverse Effects:   - Patient states that he avoids high carbohydrate, fatty food or fried foods 24 hours before his injection day  - 60 hours after injection day his adverse effects are gone  - No noted significant belching or abdominal pain     Weight Management:  Goal: 275 lb    Objective     BP Readings from Last  5 Encounters:   24 122/70   11/10/23 158/89   23 151/83   23 146/86   23 124/80     Wt Readings from Last 5 Encounters:   24 134 kg (296 lb)   24 134 kg (296 lb)   11/10/23 146 kg (321 lb)   23 (!) 157 kg (345 lb 4 oz)   23 (!) 158 kg (349 lb)     BMI Readings from Last 5 Encounters:   24 39.59 kg/m²   24 39.59 kg/m²   11/10/23 41.21 kg/m²   23 45.55 kg/m²   23 46.04 kg/m²     Diabetes Pharmacotherapy:    - Synjardy XR  mg: Take one tablet by mouth once daily   - Mounjaro 10 mg: Inject 10 mg under the skin once weekly on  (Doses Remainin)    Historical Diabetes Pharmacotherapy:   - Rybelsus 14 mg: Take one tablet by mouth once daily   - Metformin 500 mg BID    Hypertension Pharmacotherapy:  - Amlodipine/Olmesartan 10/20 mg: Take one tablet by mouth once daily  - Spironolactone 25 mg: Take one tablet by mouth once daily    Primary Prevention:   - Statin? No  - ACE-I/ARB? Yes   - Aspirin? No    Pertinent PMH Review:  - PMH of Pancreatitis: No  - PMH of Retinopathy: No  - PMH of Urinary Tract Infections: No  - PMH of MTC/MEN Type II: No    Labs:   Lab Results   Component Value Date    BILITOT 0.3 2024    CALCIUM 9.1 2024    CO2 24 2024     2024    CREATININE 0.92 2024    GLUCOSE 101 (H) 2024    ALKPHOS 56 2024    K 4.1 2024    PROT 6.4 2024     2024    AST 17 2024    ALT 20 2024    BUN 23 2024    ANIONGAP 17 2024    MG 2.20 2024    ALBUMIN 4.5 2024    GFRMALE >90 2023     Lab Results   Component Value Date    TRIG 220 (H) 2024    CHOL 170 2024    LDLCALC 87 2024    HDL 39.3 2024     Lab Results   Component Value Date    HGBA1C 5.4 2024    HGBA1C 6.6 (H) 11/10/2023    HGBA1C 7.6 (A) 2023     Component      Latest Ref Rng 2023 11/10/2023   ALBUMIN (MG/L) IN URINE      Not  Established mg/L <7.0     Albumin/Creatine Ratio SEE COMMENT  --    Creatinine, Urine Random      20.0 - 370.0 mg/dL 37.0  18.1 (L)    Albumin, Urine Random      Not established mg/L  <7.0       Health Maintenance:   Eye Exam: 11/1/2023 - No noted acute or chronic changes. Patient okay'd for Mounjaro dose increase  Lipid Panel: LDL - 111 mg/dL -->87 mg/dL on 3/5/2024   Urine Albumin: WNL - 11/10/2023    Assessment/Plan   Problem List Items Addressed This Visit       DM type 2 (diabetes mellitus, type 2) (CMS/[facility]) - Primary     Patients diabetes is improved with most recent A1c of 5.4% on 3/5/2024 improved from 6.6% on 11/10/2023 improved from baseline at 7.6% on 3/23/2023. Today we discussed continuation of therapy with Mounjaro 10 mg until next CINEMA follow up in May 2024. Glycemic, lipid, hypertensive, and weight have improved from baseline. Continue current therapy as prescribed including Mounjaro 10 mg, Synjardy Xr 25/1000 mg daily and Clayton 3 for glucose monitoring.          Relevant Medications    empagliflozin-metformin (Synjardy XR) 25-1,000 mg 24 hr tablet    High blood pressure     Blood pressure not available for today's encounter. Last BP in office well-controlled at 122/70 mmHg. Continue Amlodipine-Olmesartan 10/20 mg once daily and Spironolactone 25 mg once daily.          Relevant Medications    empagliflozin-metformin (Synjardy XR) 25-1,000 mg 24 hr tablet     Hyperlipidemia Pharmacotherapy:   Not on statin therapy -  mg/dL on 11/10/2023 --> Improved to 87 mg/dL on 3/5/2024  According to the ADA and ACC/AHA guidelines, moderate-intensity statin and lifestyle modifications are recommended for all diabetic patients aged 40-75 without contraindication to statin therapy to achieve an LDL goal of less than 70 mg/dL.  Follow-up: PRN after [facility] Visit  [facility]EMA follow up: 5/10/2024    Yaron Olivier, PharmD    Continue all meds under the continuation of care with the referring provider and  clinical pharmacy team.

## 2024-04-02 NOTE — ASSESSMENT & PLAN NOTE
Blood pressure not available for today's encounter. Last BP in office well-controlled at 122/70 mmHg. Continue Amlodipine-Olmesartan 10/20 mg once daily and Spironolactone 25 mg once daily.

## 2024-04-02 NOTE — ASSESSMENT & PLAN NOTE
Patients diabetes is improved with most recent A1c of 5.4% on 3/5/2024 improved from 6.6% on 11/10/2023 improved from baseline at 7.6% on 3/23/2023. Today we discussed continuation of therapy with Mounjaro 10 mg until next [facility] follow up in May 2024. Glycemic, lipid, hypertensive, and weight have improved from baseline. Continue current therapy as prescribed including Mounjaro 10 mg, Synjardy Xr 25/1000 mg daily and Clayton 3 for glucose monitoring.

## 2024-04-05 ENCOUNTER — PHARMACY VISIT (OUTPATIENT)
Dept: PHARMACY | Facility: CLINIC | Age: 59
End: 2024-04-05
Payer: COMMERCIAL

## 2024-04-09 ENCOUNTER — OFFICE VISIT (OUTPATIENT)
Dept: UROLOGY | Facility: CLINIC | Age: 59
End: 2024-04-09
Payer: COMMERCIAL

## 2024-04-09 VITALS — TEMPERATURE: 98 F

## 2024-04-09 DIAGNOSIS — R79.89 LOW TESTOSTERONE: ICD-10-CM

## 2024-04-09 DIAGNOSIS — N52.9 ERECTILE DYSFUNCTION, UNSPECIFIED ERECTILE DYSFUNCTION TYPE: ICD-10-CM

## 2024-04-09 PROCEDURE — 3044F HG A1C LEVEL LT 7.0%: CPT | Performed by: UROLOGY

## 2024-04-09 PROCEDURE — 1036F TOBACCO NON-USER: CPT | Performed by: UROLOGY

## 2024-04-09 PROCEDURE — 99204 OFFICE O/P NEW MOD 45 MIN: CPT | Performed by: UROLOGY

## 2024-04-09 PROCEDURE — 3048F LDL-C <100 MG/DL: CPT | Performed by: UROLOGY

## 2024-04-09 RX ORDER — TADALAFIL 20 MG/1
20 TABLET ORAL AS NEEDED
Qty: 30 TABLET | Refills: 6 | Status: SHIPPED | OUTPATIENT
Start: 2024-04-09 | End: 2025-04-09

## 2024-04-09 ASSESSMENT — PAIN SCALES - GENERAL: PAINLEVEL: 0-NO PAIN

## 2024-04-09 NOTE — PROGRESS NOTES
"Subjective   HPI:  58 y.o. yo male patient complains of  symptoms consistent with hypogonadism. Has issues with ED associated with low T.    Previous use of testosterone: Yes, describe: Androgel, currently on 1 shoulder every morning, ordered by PCP.    These include:  Decreased libido: yes  Decreased energy: yes and fatigue (was prev. Having issues with blood sugars-A1c of 9.5, so not sure if it was that)  Decreased muscle mass: not sure  Erectile Dysfunction: yes    Want to preserve fertility: No, already has 3 kids  Personal history of gynecomastia and/or concerns about the condition: No  Family / Personal History of Prostate Cancer: No  MI or Stroke: Grandfather had MI    No results found for: \"TESTOSTERONE\"  No results found for: \"LH\"  No results found for: \"FSH\"  No components found for: \"ESTRADIAL\"  No components found for: \"CBC\"  No results found for: \"PROLACTIN\"      No components found for: \"PSA;3\"    PMH:  No past medical history on file.     PSH:  Past Surgical History:   Procedure Laterality Date    OTHER SURGICAL HISTORY  01/23/2022    Achilles tendon surgery    OTHER SURGICAL HISTORY  01/23/2022    Carpal tunnel surgery    OTHER SURGICAL HISTORY  01/23/2022    Umbilical hernia repair    OTHER SURGICAL HISTORY  01/23/2022    Knee surgery    US GUIDED NEEDLE LIVER BIOPSY  11/4/2022    US GUIDED NEEDLE LIVER BIOPSY 11/4/2022        Medications:    Current Outpatient Medications:     albuterol 90 mcg/actuation inhaler, INHALE 2 PUFFS BY MOUTH AS DIRECTED INTO THE LUNGS EVERY 4 HOURS AS NEEDED FOR WHEEZING/SHORTNESS OF BREATH, Disp: , Rfl:     amlodipine-olmesartan (Shiva) 10-20 mg tablet, Take 1 tablet by mouth once daily., Disp: 90 tablet, Rfl: 3    ascorbic acid (Vitamin C) 1,000 mg tablet, Vitamin C 1000 MG Oral Tablet  Refills: 0     Active, Disp: , Rfl:     Bifidobacterium infantis (Align) 4 mg capsule, Take by mouth., Disp: , Rfl:     blood-glucose sensor (FreeStyle Clayton 3 Sensor) device, 1 each " every 14 (fourteen) days., Disp: 6 each, Rfl: 3    blood-glucose sensor (FreeStyle Clayton 3 Sensor) device, Apply 1 sensor to the back of the upper arm every 14 days. Change sensor every two weeks., Disp: 2 each, Rfl: 11    Breo Ellipta 100-25 mcg/dose inhaler, Inhale 1 puff once daily., Disp: , Rfl:     brompheniramine-pseudoeph-DM 2-30-10 mg/5 mL syrup, TAKE 10 ML BY MOUTH FOUR TIMES DAILY AS NEEDED., Disp: , Rfl:     cetirizine (ZyrTEC) 10 mg tablet, Take 1 tablet (10 mg) by mouth once daily., Disp: , Rfl:     cholecalciferol (Vitamin D3) 50 mcg (2,000 unit) capsule, Take 1 capsule (50 mcg) by mouth early in the morning.., Disp: , Rfl:     dicyclomine (Bentyl) 20 mg tablet, Take 1 tablet (20 mg) by mouth 2 times a day as needed., Disp: , Rfl:     empagliflozin-metformin (Synjardy XR) 25-1,000 mg 24 hr tablet, TAKE ONE TABLET BY MOUTH ONCE DAILY, Disp: 90 tablet, Rfl: 3    famotidine (Pepcid) 20 mg tablet, Take 1 tablet (20 mg) by mouth once daily., Disp: , Rfl:     montelukast (Singulair) 10 mg tablet, Take 1 tablet (10 mg) by mouth once daily., Disp: , Rfl:     multivitamin with minerals iron-free (Centrum Silver), Take by mouth., Disp: , Rfl:     omeprazole (PriLOSEC) 40 mg DR capsule, TAKE ONE CAPSULE BY MOUTH EVERY DAY FOR 90 DAYS, Disp: , Rfl:     ondansetron (Zofran) 4 mg tablet, TAKE ONE TABLET BY MOUTH EVERY 8 TO 12 HOURS AS NEEDED, Disp: , Rfl:     spironolactone (Aldactone) 25 mg tablet, Take 1 tablet (25 mg) by mouth once daily., Disp: 90 tablet, Rfl: 3    testosterone 20.25 mg/1.25 gram (1.62 %) gel in metered-dose pump, APPLY 1 PUMP PRESS TO ONE UPPER ARM AND SHOULDER AND THEN APPLY 1 PUMP PRESS TO THE OPPOSITE UPPER ARM AND SHOULDER ONCE DAILY [facility], Disp: , Rfl:     tirzepatide (Mounjaro) 10 mg/0.5 mL pen injector, Inject 10 mg under the skin 1 (one) time per week., Disp: 6 mL, Rfl: 0    zinc sulfate (Zincate) 220 (50 Zn) MG capsule, Take by mouth., Disp: , Rfl:     Social Hx:  Social  History     Socioeconomic History    Marital status:      Spouse name: None    Number of children: None    Years of education: None    Highest education level: None   Occupational History    None   Tobacco Use    Smoking status: Never    Smokeless tobacco: Never   Substance and Sexual Activity    Alcohol use: None    Drug use: None    Sexual activity: None   Other Topics Concern    None   Social History Narrative    None     Social Determinants of Health     Financial Resource Strain: Not on file   Food Insecurity: Not on file   Transportation Needs: Not on file   Physical Activity: Not on file   Stress: Not on file   Social Connections: Not on file   Intimate Partner Violence: Not on file   Housing Stability: Not on file      [unfilled]    x:  family history includes Aortic aneurysm in an other family member; Colon cancer in some other family members; Diabetes type II in his mother; Hodgkin's lymphoma in his mother; Hypertension in his father; cardiac disorder in an other family member; lipid disorder in his father.     Allergy:  Allergies   Allergen Reactions    Dog Dander Shortness of breath    Apple Swelling    Lisinopril Cough     Cough    Tramadol Hives    Trazodone Hcl Unknown    Tree Nuts Swelling        Exam  NAD  Nonlabored breathing  Abd nondistended    Assessment/Plan:  I reviewed the common causes of hypogonadism with the patient. We will obtain a fasting, morning hypogonadal panel to determine if his hormonal axis is abnormal. We will evaluate his lab results and if needed, at that point we will begin treatment.     We discussed different modalities to treat hypogonadism, including hcg, anastrozole, clomiphene, testosterone gels/creams, nasal testosterone, testosterone pellets, and testosterone injections. I also reviewed with him common risks following testosterone replacement, including cholesterol imbalance, polycythemia, cancer progression and infertility.  He understands these risks  and wishes to proceed. The patient is to adhere to a strict followup either every 3-4 months or biannually.  We reviewed with him with our office's consent form.     We also reviewed his fertility status and whether sperm production is a concern.  If so, HCG will be added.  We also discussed the possibility of gynecomastia secondary to testosterone therapy and methods of prevention and/or treatment if this occurs.    Hypogonadism  fasting hypogonadal panel and repeat total T    Screening for polycythemia  Hct  prn phlebotomy for Hct>54    Screening for hyperestrogenemia  E2   arimidex if E is elevated    Prostate ca screening  psa    Fertility preservation  Not interested    Fu after bloodwork

## 2024-04-09 NOTE — PROGRESS NOTES
Subjective   HPI:  58 y.o. yo male patient complains of  symptoms consistent with hypogonadism. Has issues with ED associated with low T.     Previous use of testosterone: Yes, describe: Androgel, currently on 1 shoulder every morning, ordered by PCP.     These include:  Decreased libido: yes  Decreased energy: yes and fatigue (was prev. Having issues with blood sugars-A1c of 9.5, so not sure if it was that)  Decreased muscle mass: not sure  Erectile Dysfunction: yes     Want to preserve fertility: No, already has 3 kids  Personal history of gynecomastia and/or concerns about the condition: No  Family / Personal History of Prostate Cancer: No  MI or Stroke: Grandfather had MI    #Erectile Dysfunction  -has tried Viagra, unsure of dose  -Viagra worked well the first few times, did not get much of a response after  - no NTG  -has DM     of [employer]    Testosterone  3/5/24: 416, Free 77  8/18/23: 359, Free 63.3  3/23/23: 564, Free 124.1  7/21/22: 185, Free 43  4/14/22: 276, Free 61  1/19/22: 190, Free 42.2  1/25/21: 163, Free 37    Component      Latest Ref Rng 3/5/2024   Hemoglobin A1C      see below % 5.4    Estimated Average Glucose      Not Established mg/dL 108      Lab Results   Component Value Date    PSA 0.29 03/23/2023    PSA 0.21 01/19/2022       PMH:  No past medical history on file.     PSH:  Past Surgical History:   Procedure Laterality Date    OTHER SURGICAL HISTORY  01/23/2022    Achilles tendon surgery    OTHER SURGICAL HISTORY  01/23/2022    Carpal tunnel surgery    OTHER SURGICAL HISTORY  01/23/2022    Umbilical hernia repair    OTHER SURGICAL HISTORY  01/23/2022    Knee surgery    US GUIDED NEEDLE LIVER BIOPSY  11/4/2022    US GUIDED NEEDLE LIVER BIOPSY 11/4/2022        Medications:    Current Outpatient Medications:     albuterol 90 mcg/actuation inhaler, INHALE 2 PUFFS BY MOUTH AS DIRECTED INTO THE LUNGS EVERY 4 HOURS AS NEEDED FOR WHEEZING/SHORTNESS OF BREATH, Disp: , Rfl:      amlodipine-olmesartan (Shiva) 10-20 mg tablet, Take 1 tablet by mouth once daily., Disp: 90 tablet, Rfl: 3    ascorbic acid (Vitamin C) 1,000 mg tablet, Vitamin C 1000 MG Oral Tablet  Refills: 0     Active, Disp: , Rfl:     Bifidobacterium infantis (Align) 4 mg capsule, Take by mouth., Disp: , Rfl:     blood-glucose sensor (FreeStyle Clayton 3 Sensor) device, 1 each every 14 (fourteen) days., Disp: 6 each, Rfl: 3    blood-glucose sensor (FreeStyle Clayton 3 Sensor) device, Apply 1 sensor to the back of the upper arm every 14 days. Change sensor every two weeks., Disp: 2 each, Rfl: 11    Breo Ellipta 100-25 mcg/dose inhaler, Inhale 1 puff once daily., Disp: , Rfl:     brompheniramine-pseudoeph-DM 2-30-10 mg/5 mL syrup, TAKE 10 ML BY MOUTH FOUR TIMES DAILY AS NEEDED., Disp: , Rfl:     cetirizine (ZyrTEC) 10 mg tablet, Take 1 tablet (10 mg) by mouth once daily., Disp: , Rfl:     cholecalciferol (Vitamin D3) 50 mcg (2,000 unit) capsule, Take 1 capsule (50 mcg) by mouth early in the morning.., Disp: , Rfl:     dicyclomine (Bentyl) 20 mg tablet, Take 1 tablet (20 mg) by mouth 2 times a day as needed., Disp: , Rfl:     empagliflozin-metformin (Synjardy XR) 25-1,000 mg 24 hr tablet, TAKE ONE TABLET BY MOUTH ONCE DAILY, Disp: 90 tablet, Rfl: 3    famotidine (Pepcid) 20 mg tablet, Take 1 tablet (20 mg) by mouth once daily., Disp: , Rfl:     montelukast (Singulair) 10 mg tablet, Take 1 tablet (10 mg) by mouth once daily., Disp: , Rfl:     multivitamin with minerals iron-free (Centrum Silver), Take by mouth., Disp: , Rfl:     omeprazole (PriLOSEC) 40 mg DR capsule, TAKE ONE CAPSULE BY MOUTH EVERY DAY FOR 90 DAYS, Disp: , Rfl:     ondansetron (Zofran) 4 mg tablet, TAKE ONE TABLET BY MOUTH EVERY 8 TO 12 HOURS AS NEEDED, Disp: , Rfl:     spironolactone (Aldactone) 25 mg tablet, Take 1 tablet (25 mg) by mouth once daily., Disp: 90 tablet, Rfl: 3    testosterone 20.25 mg/1.25 gram (1.62 %) gel in metered-dose pump, APPLY 1 PUMP PRESS TO  ONE UPPER ARM AND SHOULDER AND THEN APPLY 1 PUMP PRESS TO THE OPPOSITE UPPER ARM AND SHOULDER ONCE DAILY IN THE MORNIN, Disp: , Rfl:     tirzepatide (Mounjaro) 10 mg/0.5 mL pen injector, Inject 10 mg under the skin 1 (one) time per week., Disp: 6 mL, Rfl: 0    zinc sulfate (Zincate) 220 (50 Zn) MG capsule, Take by mouth., Disp: , Rfl:     Social Hx:  Social History     Socioeconomic History    Marital status:      Spouse name: None    Number of children: None    Years of education: None    Highest education level: None   Occupational History    None   Tobacco Use    Smoking status: Never    Smokeless tobacco: Never   Substance and Sexual Activity    Alcohol use: None    Drug use: None    Sexual activity: None   Other Topics Concern    None   Social History Narrative    None     Social Determinants of Health     Financial Resource Strain: Not on file   Food Insecurity: Not on file   Transportation Needs: Not on file   Physical Activity: Not on file   Stress: Not on file   Social Connections: Not on file   Intimate Partner Violence: Not on file   Housing Stability: Not on file      [unfilled]    x:  family history includes Aortic aneurysm in an other family member; Colon cancer in some other family members; Diabetes type II in his mother; Hodgkin's lymphoma in his mother; Hypertension in his father; cardiac disorder in an other family member; lipid disorder in his father.     Allergy:  Allergies   Allergen Reactions    Dog Dander Shortness of breath    Apple Swelling    Lisinopril Cough     Cough    Tramadol Hives    Trazodone Hcl Unknown    Tree Nuts Swelling        Exam  Testicles descended bilaterally, nontender, no masses  Vasa palpable bilaterally  Penis circ'd, no lesions, no plaques    Assessment/Plan:  #Hypogonadism  -Patient has appropriate T levels while on his current TRT regimen, would not offer to change his treatment at this time.    #Erectile Dysfunction: I discussed the etiology and  different treatment modalities for erectile dysfunction. Specifically, we talked about lifestyle factors like smoking, diet, and exercise. We also discussed ED as a sign of systemic disease, and the contributions of elevated cholesterol and elevated blood sugars on erections. We then discussed treatment modalities, specifically PDE5 inhibitors, intracavernosal injections, vacuum erectile devices, and penile prostheses.    Trial of Cialis 20mg - medication counseling done. Can start with half tab. Discussed side effects including priapism, headaches, stuffiness, and back pain. Discussed that if he gets an erection >4 hours, he needs to present to the emergency room or risk worsening of his erectile dysfunction long term.   Referral to Dr. Stevenson, sex therapist/counselor    Fu in 2 months     Scribe Attestation  By signing my name below, I, Wendy Morataya, Odalis, attest that this documentation  has been prepared under the direction and in the presence of Osiris Pruitt MD.

## 2024-04-18 ENCOUNTER — TELEMEDICINE (OUTPATIENT)
Dept: BEHAVIORAL HEALTH | Facility: CLINIC | Age: 59
End: 2024-04-18
Payer: COMMERCIAL

## 2024-04-18 DIAGNOSIS — F43.23 ADJUSTMENT DISORDER WITH MIXED ANXIETY AND DEPRESSED MOOD: ICD-10-CM

## 2024-04-18 PROCEDURE — 3048F LDL-C <100 MG/DL: CPT | Performed by: PSYCHOLOGIST

## 2024-04-18 PROCEDURE — 90837 PSYTX W PT 60 MINUTES: CPT | Performed by: PSYCHOLOGIST

## 2024-04-18 PROCEDURE — 3044F HG A1C LEVEL LT 7.0%: CPT | Performed by: PSYCHOLOGIST

## 2024-04-19 NOTE — PROGRESS NOTES
"3 PM [id number] [facility], Stress (60 MIN, [phone])  Virtual. Supportive Therapy. Experiencing \"unexpected episode of severe grief.\" Daughter had first of several surgeries to transition to male, having had breast removed this past Friday. Had hoped he might change mind. \"Praying for him\". Oldest daughter, \"chastising me\", indicating \"I've enabled little sister to self-mutilation. Affront to God.\" Daughter has since apologized but still hard on patient, hurt. Mentioned gender dysphoria, real dx, noted surgery/transition was \"9 years in the making\" but situation has added to his stress and anxiety. Son had complications with surgery for removal of breast, had to rush to [facility], same weekend as eclipse. Difficult, also trying to help with recovery/drainage tubes. Second surgery would be a radical hysterectomy. \"Leaves me unsettled.\" Hopes son will have peace, wondering about future. 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 His sleep has been off. Feels need for some time off. \"Grief comes on waves.\" Hopes to clean up his office, garage, prayer room. Discussed sense of loss of control.  Praying more, trying to take comfort in his felice. \"Want to get child to heaven\", having discussed issues with priests in the past. \"Have to trust God's mercy.\" Had been feeling sick, stressed, began feeling better yesterday. Next: to call.   "

## 2024-04-22 DIAGNOSIS — E11.9 TYPE 2 DIABETES MELLITUS WITHOUT COMPLICATION, WITHOUT LONG-TERM CURRENT USE OF INSULIN (MULTI): ICD-10-CM

## 2024-04-22 PROCEDURE — RXMED WILLOW AMBULATORY MEDICATION CHARGE

## 2024-04-22 RX ORDER — TIRZEPATIDE 10 MG/.5ML
10 INJECTION, SOLUTION SUBCUTANEOUS
Qty: 2 ML | Refills: 1 | Status: SHIPPED | OUTPATIENT
Start: 2024-04-28 | End: 2024-05-10 | Stop reason: ALTCHOICE

## 2024-04-22 NOTE — PROGRESS NOTES
Patient contacted for refill with Mounjaro 10 mg - Updated prescription for 1 month with 1 refill sent to [facility] pharmacy.     CINEMA Follow up: 5/10/2024     Yaron Olivier, PharmD

## 2024-04-24 ENCOUNTER — PHARMACY VISIT (OUTPATIENT)
Dept: PHARMACY | Facility: CLINIC | Age: 59
End: 2024-04-24
Payer: COMMERCIAL

## 2024-04-29 DIAGNOSIS — E11.9 TYPE 2 DIABETES MELLITUS WITHOUT COMPLICATION, WITHOUT LONG-TERM CURRENT USE OF INSULIN (MULTI): ICD-10-CM

## 2024-04-29 DIAGNOSIS — I50.30 HEART FAILURE WITH PRESERVED EJECTION FRACTION, UNSPECIFIED HF CHRONICITY (MULTI): ICD-10-CM

## 2024-04-29 DIAGNOSIS — I10 HYPERTENSION, UNSPECIFIED TYPE: Primary | ICD-10-CM

## 2024-05-08 NOTE — PROGRESS NOTES
[facility] ([facility])      Reason for Visit: FOLLOW UP  VISIT [facility]    PROBLEM LIST  1. T2DM/IR. On Met+SGLT2i.. Last HbA1c in March 2023 was 7.6.  His most recent hemoglobin A1c after starting the [facility] program is 5.4.  He is currently on tirzepatide 10 mg in conjunction with Synjardy XR which is a combination of empagliflozin and metformin.    2.  Hyperlipidemia. LDL 59; VLDL 55; NHDL 114.     2. Hypertension.  On combination of amlodipine/olmesartan 10/20 with spironolactone 25 mg. Echo demonstrates normal LV function. No evidence of diastolic dysfunction    3. HFPEF. ACC/AHA Stage B. NYYA Class 2.    4. NAFLD. Fibroscan positive for Cirrhosis. FIB-4 was 1.113    5. Hyperlipidemia.  Most recent HDL of 38 and LDL of 87 with triglycerides of 220 obtained in 3/2024.  A prior NMR lipoprotein profile performed in 2023 revealed: total LDL particles 1000 341 at that time with small LDL particles of 1036 which are quite elevated.    6. 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 IWMI pattern on EKG    7. Zero CAC 2022      HISTORY OF PRESENT ILLNESS    During his last visit we transitioned him to tirzepatide because he had not responded to Rybelsus.  His hemoglobin A1c is markedly improved after switching him over to a combination of an SGLT2 inhibitor with metformin.  He was provided a continuous glucose monitor during his last visit.  The CGM also demonstrates excellent glucose control.    His blood pressures at home are consistently in the 110 to 120 mmHg systolic blood pressure range.  We had a discussion about his diet    And while he has made substantial changes he still continues to to consume dinners that I am not entirely diabetic friendly.  This is because he eats what his wife cooks for him and sometimes those choices are not necessarily low-carb and plant-based.    He still has to travel quite frequently for his job and during these occasions he is  not in control of his diet.    We had considered a cardiac MRI to assess fibrosis and frequent PVCs with exercise and inferior wall myocardial infarction pattern on his EKG during his last visit.  This does not appear to have been performed      Exercise Stress Test    Mr. Dodd underwent an exercise stress test in March 2023 and completed 8.7 METS on a Raman protocol. He terminated the test owing to dyspnea. He had frequent PVCs although his ST responses were within normal limits. There was no angina during stress.      Past Medical History:  He has no past medical history on file.    Past Surgical History:  He has a past surgical history that includes Other surgical history (01/23/2022); Other surgical history (01/23/2022); Other surgical history (01/23/2022); Other surgical history (01/23/2022); and US guided needle liver biopsy (11/4/2022).    Social History:  He reports that he has never smoked. He has never used smokeless tobacco.    Outpatient Medications:  Current Outpatient Medications   Medication Instructions    albuterol 90 mcg/actuation inhaler INHALE 2 PUFFS BY MOUTH AS DIRECTED INTO THE LUNGS EVERY 4 HOURS AS NEEDED FOR WHEEZING/SHORTNESS OF BREATH    amlodipine-olmesartan (Shiva) 10-20 mg tablet 1 tablet, oral, Daily    ascorbic acid (Vitamin C) 1,000 mg tablet Vitamin C 1000 MG Oral Tablet   Refills: 0       Active    Bifidobacterium infantis (Align) 4 mg capsule oral    blood-glucose sensor (FreeStyle Clayton 3 Sensor) device 1 each, miscellaneous, Every 14 days    blood-glucose sensor (FreeStyle Clayton 3 Sensor) device Apply 1 sensor to the back of the upper arm every 14 days. Change sensor every two weeks.    Breo Ellipta 100-25 mcg/dose inhaler 1 puff, inhalation, Daily    brompheniramine-pseudoeph-DM 2-30-10 mg/5 mL syrup TAKE 10 ML BY MOUTH FOUR TIMES DAILY AS NEEDED.    cetirizine (ZyrTEC) 10 mg tablet 1 tablet, oral, Daily    cholecalciferol (VITAMIN D3) 2,000 Units, oral, Daily    dicyclomine  (BENTYL) 20 mg, oral, 2 times daily PRN    empagliflozin-metformin (Synjardy XR) 25-1,000 mg 24 hr tablet TAKE ONE TABLET BY MOUTH ONCE DAILY    famotidine (PEPCID) 20 mg, oral, Daily    montelukast (SINGULAIR) 10 mg, oral, Daily    Mounjaro 10 mg, subcutaneous, Once Weekly    multivitamin with minerals iron-free (Centrum Silver) oral    omeprazole (PriLOSEC) 40 mg DR capsule TAKE ONE CAPSULE BY MOUTH EVERY DAY FOR 90 DAYS    ondansetron (Zofran) 4 mg tablet TAKE ONE TABLET BY MOUTH EVERY 8 TO 12 HOURS AS NEEDED    spironolactone (ALDACTONE) 25 mg, oral, Daily    tadalafil (CIALIS) 20 mg, oral, As needed    testosterone 20.25 mg/1.25 gram (1.62 %) gel in metered-dose pump APPLY 1 PUMP PRESS TO ONE UPPER ARM AND SHOULDER AND THEN APPLY 1 PUMP PRESS TO THE OPPOSITE UPPER ARM AND SHOULDER ONCE DAILY IN THE MORNIN    zinc sulfate (Zincate) 220 (50 Zn) MG capsule oral       Last Recorded Vitals:  There were no vitals filed for this visit.  There is no height or weight on file to calculate BMI.     Physical Examination:  General: Well appearing, well-nourished, in no acute distress.  HEENT: Normocephalic atraumatic, pupils equal and reactive to light, extraocular muscles intact, no conjunctival injection, oropharynx clear without exudates.  Neck: Normal carotid arterial pulses, no arterial bruits, no thyromegaly.  Cardiac: Regular rhythm and normal heart rate.  S1, S2 present and normal.  No murmurs, rubs, or gallops.   Pulmonary: No increased work of breathing, no wheezes or crackles.  GI: Normal bowel sounds, no organomegaly appreciated;  no abdominal bruits.      Laboratory Studies:  Lab Results   Component Value Date    GLUCOSE 101 (H) 03/05/2024    CALCIUM 9.1 03/05/2024     03/05/2024    K 4.1 03/05/2024    CO2 24 03/05/2024     03/05/2024    BUN 23 03/05/2024    CREATININE 0.92 03/05/2024     Lab Results   Component Value Date    CHOL 170 03/05/2024    CHOL 193 11/10/2023    CHOL 164 07/14/2023     Lab  "Results   Component Value Date    HDL 39.3 03/05/2024    HDL 40.7 11/10/2023    HDL 35.8 (A) 07/14/2023     Lab Results   Component Value Date    LDLCALC 87 03/05/2024    LDLCALC 111 (H) 11/10/2023     Lab Results   Component Value Date    TRIG 220 (H) 03/05/2024    TRIG 205 (H) 11/10/2023    TRIG 283 (H) 07/14/2023     No components found for: \"CHOLHDL\"  Lab Results   Component Value Date    HGBA1C 5.4 03/05/2024    HGBA1C 6.6 (H) 11/10/2023    HGBA1C 7.6 (A) 03/23/2023     Lab Results   Component Value Date    LDLCALC 87 03/05/2024    CREATININE 0.92 03/05/2024            Cardiology Tests:    Echo:  No results found for this or any previous visit from the past 1095 days.      Cath:  No results found for this or any previous visit from the past 1095 days.      Stress Test:  No results found for this or any previous visit from the past 1095 days.      Cardiac Imaging:  CT HEART CALCIUM SCORING WO IV CONTRAST 2/13/2023    CAC of 0    Assessment and Plan:  Jimmie Dodd is a 58 y.o. male who presents for a follow-up evaluation in the [employer] program.    58-year-old with a history of Class 4 Obesity and T2DM and hypertension who presents for a follow up evaluation.  He has no evidence of target organ complications.    PLAN  1. T2D.  He has lost approximately 90 pounds which corresponds 19% of body weight loss since beginning [employer] and is very pleased with his progress.  We will uptitrate his Mounjaro to 12.5 mg and up to 15 mg with the eventual objective of losing 25% of body weight which is possible for him.    2. Hypertension. This is well controlled.    3.  Hypertriglyceridemia..  He is currently not on a statin although his LDL particles were elevated on NMR lipoprotein profile.  He had wanted to wait on this.  He will need extensive dietary coaching in order to reduce dietary sources of hypertriglyceridemia.     4. Follow up in 6 months.    5. Cardiac MRI to assess for fibrosis and in light of PVCs with " exercise and an IWMI pattern on his EKG prior to the next visit.        Education: Reviewed ‘ABCs’ of diabetes management (respective goals in parentheses):  A1C (<6), blood pressure (<130/80), and cholesterol (LDL <70).  Compliance at present is estimated to be good.   Follow up: 6 month      Jacinto Willis MD, FACC, FASANDRA.  Chief, Cardiovascular Medicine  [facility]  Chief Academic and Scientific Officer  [employer]  Professor of Medicine, Radiology and Biomedical Engineering  [employer]  [place], [place]

## 2024-05-10 ENCOUNTER — LAB (OUTPATIENT)
Dept: LAB | Facility: LAB | Age: 59
End: 2024-05-10
Payer: COMMERCIAL

## 2024-05-10 ENCOUNTER — OFFICE VISIT (OUTPATIENT)
Dept: CARDIOLOGY | Facility: HOSPITAL | Age: 59
End: 2024-05-10
Payer: COMMERCIAL

## 2024-05-10 VITALS
WEIGHT: 294.4 LBS | OXYGEN SATURATION: 95 % | DIASTOLIC BLOOD PRESSURE: 81 MMHG | BODY MASS INDEX: 37.78 KG/M2 | HEIGHT: 74 IN | SYSTOLIC BLOOD PRESSURE: 131 MMHG | HEART RATE: 95 BPM

## 2024-05-10 DIAGNOSIS — I10 HYPERTENSION, UNSPECIFIED TYPE: ICD-10-CM

## 2024-05-10 DIAGNOSIS — E11.9 TYPE 2 DIABETES MELLITUS WITHOUT COMPLICATION, WITHOUT LONG-TERM CURRENT USE OF INSULIN (MULTI): Primary | ICD-10-CM

## 2024-05-10 DIAGNOSIS — I50.30 HEART FAILURE WITH PRESERVED EJECTION FRACTION, UNSPECIFIED HF CHRONICITY (MULTI): ICD-10-CM

## 2024-05-10 DIAGNOSIS — E11.9 TYPE 2 DIABETES MELLITUS WITHOUT COMPLICATION, WITHOUT LONG-TERM CURRENT USE OF INSULIN (MULTI): ICD-10-CM

## 2024-05-10 DIAGNOSIS — I49.3 PVC (PREMATURE VENTRICULAR CONTRACTION): ICD-10-CM

## 2024-05-10 LAB
CREAT UR-MCNC: 103.4 MG/DL (ref 20–370)
MICROALBUMIN UR-MCNC: 8.5 MG/L
MICROALBUMIN/CREAT UR: 8.2 UG/MG CREAT

## 2024-05-10 PROCEDURE — 3061F NEG MICROALBUMINURIA REV: CPT | Performed by: INTERNAL MEDICINE

## 2024-05-10 PROCEDURE — 3075F SYST BP GE 130 - 139MM HG: CPT | Performed by: INTERNAL MEDICINE

## 2024-05-10 PROCEDURE — 3044F HG A1C LEVEL LT 7.0%: CPT | Performed by: INTERNAL MEDICINE

## 2024-05-10 PROCEDURE — 3079F DIAST BP 80-89 MM HG: CPT | Performed by: INTERNAL MEDICINE

## 2024-05-10 PROCEDURE — 99215 OFFICE O/P EST HI 40 MIN: CPT | Performed by: INTERNAL MEDICINE

## 2024-05-10 PROCEDURE — RXMED WILLOW AMBULATORY MEDICATION CHARGE

## 2024-05-10 PROCEDURE — 82043 UR ALBUMIN QUANTITATIVE: CPT

## 2024-05-10 PROCEDURE — 82570 ASSAY OF URINE CREATININE: CPT

## 2024-05-10 PROCEDURE — 36415 COLL VENOUS BLD VENIPUNCTURE: CPT

## 2024-05-10 PROCEDURE — 3048F LDL-C <100 MG/DL: CPT | Performed by: INTERNAL MEDICINE

## 2024-05-10 PROCEDURE — 83704 LIPOPROTEIN BLD QUAN PART: CPT

## 2024-05-10 PROCEDURE — 1036F TOBACCO NON-USER: CPT | Performed by: INTERNAL MEDICINE

## 2024-05-10 RX ORDER — TIRZEPATIDE 12.5 MG/.5ML
12.5 INJECTION, SOLUTION SUBCUTANEOUS
Qty: 2 ML | Refills: 3 | Status: SHIPPED | OUTPATIENT
Start: 2024-05-10 | End: 2024-05-24 | Stop reason: ALTCHOICE

## 2024-05-10 ASSESSMENT — PAIN SCALES - GENERAL: PAINLEVEL: 0-NO PAIN

## 2024-05-10 NOTE — PATIENT INSTRUCTIONS
Eating tips to reduce nausea and unwanted GI side effects from Ozempic and Mounjaro   Eat slowly  Eat smaller portions  Avoid laying down right after meal  Stop eating when feeling full  Avoid drinking from straw  Stay hydrated, drink small amounts of water throughout the day  Avoid strenuous exercise after eating  Avoid eating close too bedtime    Meal planning tips  Avoid fried foods, and high fat meals  Focus on bland foods  Choose water rich foods like soups, kefir, protein drinks/smoothies     Lifestyle suggestions  Keep a food/symptom diary, as it may be helpful to identify meal timing of foods that make nausea worse.  Engage in light exercise (walking), get fresh air    Additional tips for alleviating nausea:  Wait at least 30 minutes after GLP1-RA dose to eat, if feeling nauseated try crackers, apples, mint, ita root or ita tea  Avoid strong smells    If vomiting:  Eat smaller amounts of food in more frequent meals  Stay hydrated, but avoid drinks during meals, wait 30-60 minutes before and after meals to have liquids    If having diarrhea:  Generous hydration, i.e: water with lemon and tsp baking soda  Avoid dairy products, laxatives, coffee, alcoholic beverages, soft drinks, very cold or very hot foods, products that contain sugar alcohols (sorbitol, mannitol, xylitol, maltitol), including gum and candy  Avoid (or temporarily reduce your intake of) foods with high fiber content such as grain and seed products, whole grain breads, artichokes, asparagus, beans, cabbage, lentils, mushrooms, onions, garlic, sugar snap peas, skinned fruits, apples, apricots, blackberries, cherries, asher, nectarine, pears, plum  Eat chicken broth, rice, carrots, very ripe fruit without skin  Gradually increase fiber back in when symptoms improve    If having constipation:  Ensure the amount of fiber in diet is adequate (goal is minimum of 25 grams per day).  Increase physical activity  Drink generous amounts of water

## 2024-05-13 ENCOUNTER — PHARMACY VISIT (OUTPATIENT)
Dept: PHARMACY | Facility: CLINIC | Age: 59
End: 2024-05-13
Payer: COMMERCIAL

## 2024-05-14 LAB
CHOLEST SERPL-MCNC: 174 MG/DL (ref 100–199)
HDL SERPL-SCNC: 30.6 UMOL/L
HDLC SERPL-MCNC: 47 MG/DL
LDL SERPL QN: 21.1 NM
LDL SERPL-SCNC: 1149 NMOL/L
LDL SMALL SERPL-SCNC: 361 NMOL/L
LDLC SERPL CALC-MCNC: 98 MG/DL (ref 0–99)
LP-IR SCORE SERPL: 49
TRIGL SERPL-MCNC: 165 MG/DL (ref 0–149)

## 2024-05-24 ENCOUNTER — TELEMEDICINE (OUTPATIENT)
Dept: PHARMACY | Facility: HOSPITAL | Age: 59
End: 2024-05-24
Payer: COMMERCIAL

## 2024-05-24 ENCOUNTER — TELEMEDICINE (OUTPATIENT)
Dept: BEHAVIORAL HEALTH | Facility: CLINIC | Age: 59
End: 2024-05-24
Payer: COMMERCIAL

## 2024-05-24 DIAGNOSIS — E11.9 TYPE 2 DIABETES MELLITUS WITHOUT COMPLICATION, WITHOUT LONG-TERM CURRENT USE OF INSULIN (MULTI): Primary | ICD-10-CM

## 2024-05-24 DIAGNOSIS — F43.23 ADJUSTMENT DISORDER WITH MIXED ANXIETY AND DEPRESSED MOOD: ICD-10-CM

## 2024-05-24 DIAGNOSIS — I10 HYPERTENSION, UNSPECIFIED TYPE: ICD-10-CM

## 2024-05-24 PROCEDURE — RXMED WILLOW AMBULATORY MEDICATION CHARGE

## 2024-05-24 PROCEDURE — 3048F LDL-C <100 MG/DL: CPT | Performed by: PSYCHOLOGIST

## 2024-05-24 PROCEDURE — 90834 PSYTX W PT 45 MINUTES: CPT | Performed by: PSYCHOLOGIST

## 2024-05-24 PROCEDURE — 3044F HG A1C LEVEL LT 7.0%: CPT | Performed by: PSYCHOLOGIST

## 2024-05-24 PROCEDURE — 3061F NEG MICROALBUMINURIA REV: CPT | Performed by: PSYCHOLOGIST

## 2024-05-24 RX ORDER — TIRZEPATIDE 12.5 MG/.5ML
12.5 INJECTION, SOLUTION SUBCUTANEOUS
Qty: 2 ML | Refills: 11 | Status: SHIPPED | OUTPATIENT
Start: 2024-05-26

## 2024-05-24 ASSESSMENT — ENCOUNTER SYMPTOMS: VISUAL CHANGE: 1

## 2024-05-24 NOTE — PROGRESS NOTES
"4 PM [address] [facility], Stress (45 MIN, [phone])  Virtual. Supportive Therapy. Feeling overwhelmed when he reached out earlier in week. Felt better after talking to  for a few hours. Stress with work situation has impacted sleep, several nights of poor sleep. 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 Had backing from Board but after a comment regarding termination, wonders if everyone supports him. Board chair checked in later, after meeting to see how he was doing. She said he has board's \"full support.\" The board was to send letter to the complainant stating there was no basis for claims made against him. Was going to talk to employee but was informed that letter not yet sent, frustrating. Discussed conversation with  about his \"avoidance/fear problem\" and fear cycle. Feeling isolated, some concerns about leadership. Able to assess fear cycle, able to see some of this no longer applies to current situations, \"most addressed\". Feeling stuck with current situation, accustomed to having clearer vision out of problems. Sees as tactical issue vs strategic. Difficulty with finding clear path ahead. Pointed out that he's followed his just culture process, also that others on executive team have seen some of same problems he has experienced with difficult \"problematic\" core member. Meeting with board chair later. Next: 2 weeks.    "

## 2024-05-24 NOTE — PROGRESS NOTES
fPatient is sent at the request of Jacinto Willis MD for medication management.  My final recommendations will be communicated back to the requesting provider by way of shared medical record.    Subjective     Jimmie Dodd is a 58 y.o. year old male patient with controlled type two diabetes mellitus (A1c 5.4%), fatty liver, class II obesity (39.59  kg/m2), hypertension, SAMPSON, and hyperlipidemia. Patient follows through the [facility] clinic. George completed updated labs on 3/5/2024 including a follow-up annual visit with his PCP. BMI has improved from class III to class II, hypertension well-controlled, A1c improved again to 5.4%, LDL improved to 84 mg/dL. Today we are following up to review current medication therapy.    Diabetes  He presents for his follow-up diabetic visit. He has type 2 diabetes mellitus. His disease course has been improving. There are no hypoglycemic associated symptoms. Associated symptoms include visual change. Symptoms are stable. Risk factors for coronary artery disease include diabetes mellitus, hypertension, male sex and obesity. He is compliant with treatment all of the time. His weight is decreasing steadily. He never participates in exercise. His overall blood glucose range is 130-140 mg/dl. An ACE inhibitor/angiotensin II receptor blocker is being taken. Eye exam is current.     Allergies   Allergen Reactions    Dog Dander Shortness of breath    Apple Swelling    Lisinopril Cough     Cough    Tramadol Hives    Trazodone Hcl Unknown    Tree Nuts Swelling     Current BG monitoring regimen:   Patient is using: continuous glucose monitor - [place] 3                         Adverse Effects:   No reported adverse effects at this time     Weight Management:  Not weighing at home  Office Weight 5/10/2024 - 294 lb   Goal: 275 lb    Objective     BP Readings from Last 5 Encounters:   05/10/24 131/81   03/05/24 122/70   11/10/23 158/89   07/14/23 151/83   05/25/23 146/86     Wt Readings from  Last 5 Encounters:   05/10/24 134 kg (294 lb 6.4 oz)   24 134 kg (296 lb)   24 134 kg (296 lb)   11/10/23 146 kg (321 lb)   23 (!) 157 kg (345 lb 4 oz)     BMI Readings from Last 5 Encounters:   05/10/24 37.80 kg/m²   24 39.59 kg/m²   24 39.59 kg/m²   11/10/23 41.21 kg/m²   23 45.55 kg/m²     Diabetes Pharmacotherapy:    - Synjardy XR  mg: Take one tablet by mouth once daily   - Mounjaro 10 mg: Inject 10 mg under the skin once weekly on  (Doses Remainin; Final injection completed on 2024)    Historical Diabetes Pharmacotherapy:   - Rybelsus 14 mg: Take one tablet by mouth once daily   - Metformin 500 mg BID    Hypertension Pharmacotherapy:  - Amlodipine/Olmesartan 10/20 mg: Take one tablet by mouth once daily  - Spironolactone 25 mg: Take one tablet by mouth once daily    Primary Prevention:   - Statin? No  - ACE-I/ARB? Yes   - Aspirin? No    Pertinent PMH Review:  - PMH of Pancreatitis: No  - PMH of Retinopathy: No  - PMH of Urinary Tract Infections: No  - PMH of MTC/MEN Type II: No    Labs:   Lab Results   Component Value Date    BILITOT 0.3 2024    CALCIUM 9.1 2024    CO2 24 2024     2024    CREATININE 0.92 2024    GLUCOSE 101 (H) 2024    ALKPHOS 56 2024    K 4.1 2024    PROT 6.4 2024     2024    AST 17 2024    ALT 20 2024    BUN 23 2024    ANIONGAP 17 2024    MG 2.20 2024    ALBUMIN 4.5 2024    GFRMALE >90 2023     Lab Results   Component Value Date    TRIG 220 (H) 2024    CHOL 170 2024    LDLCALC 87 2024    HDL 39.3 2024     Lab Results   Component Value Date    HGBA1C 5.4 2024    HGBA1C 6.6 (H) 11/10/2023    HGBA1C 7.6 (A) 2023     Component      Latest Ref Rng 11/10/2023 5/10/2024   Albumin, Urine Random      Not established mg/L <7.0  8.5    Creatinine, Urine Random      20.0 - 370.0 mg/dL 18.1 (L)   103.4    Albumin/Creatine Ratio      <30.0 ug/mg Creat --  8.2      Health Maintenance:   Eye Exam: 11/1/2023 - No noted acute or chronic changes. Patient okay'd for Mounjaro dose increase  Lipid Panel: LDL - 111 mg/dL -->87 mg/dL on 3/5/2024   Urine Albumin: WNL - 11/10/2023 --> 5/10/2024 WNL    Assessment/Plan   Problem List Items Addressed This Visit       DM type 2 (diabetes mellitus, type 2) (Multi) - Primary    Relevant Medications    tirzepatide (Mounjaro) 12.5 mg/0.5 mL pen injector (Start on 5/26/2024)    High blood pressure    Relevant Medications    tirzepatide (Mounjaro) 12.5 mg/0.5 mL pen injector (Start on 5/26/2024)     Diabetes Pharmacotherapy: Patient completed final injection of Mounjaro 10 mg today. Advised to increase to 12.5 mg as discussed with Dr. Willis. We will follow up on week 3 of his injection to review tolerability.  Increase Mounjaro from 10 mg to 12.5   Continue Synjardy Xr 25/1000 mg daily  Hypertension Pharmacotherapy  Continue Shiva 10/20 mg and Spironolactone 25 mg daily  Hyperlipidemia Pharmacotherapy:   Not on statin therapy -  mg/dL on 11/10/2023 --> Improved to 87 mg/dL on 3/5/2024  According to the ADA and ACC/AHA guidelines, moderate-intensity statin and lifestyle modifications are recommended for all diabetic patients aged 40-75 without contraindication to statin therapy to achieve an LDL goal of less than 70 mg/dL.  Follow-up: 6/14/2024 - 11:30 am  [facility] follow up: 5/10/2024    Yaron Olivier, PharmD    Continue all meds under the continuation of care with the referring provider and clinical pharmacy team.

## 2024-05-25 ENCOUNTER — PHARMACY VISIT (OUTPATIENT)
Dept: PHARMACY | Facility: CLINIC | Age: 59
End: 2024-05-25
Payer: COMMERCIAL

## 2024-05-28 ENCOUNTER — PHARMACY VISIT (OUTPATIENT)
Dept: PHARMACY | Facility: CLINIC | Age: 59
End: 2024-05-28
Payer: COMMERCIAL

## 2024-05-30 ENCOUNTER — APPOINTMENT (OUTPATIENT)
Dept: BEHAVIORAL HEALTH | Facility: CLINIC | Age: 59
End: 2024-05-30
Payer: COMMERCIAL

## 2024-06-01 PROCEDURE — RXMED WILLOW AMBULATORY MEDICATION CHARGE

## 2024-06-05 ENCOUNTER — PHARMACY VISIT (OUTPATIENT)
Dept: PHARMACY | Facility: CLINIC | Age: 59
End: 2024-06-05
Payer: COMMERCIAL

## 2024-06-11 ENCOUNTER — APPOINTMENT (OUTPATIENT)
Dept: UROLOGY | Facility: CLINIC | Age: 59
End: 2024-06-11
Payer: COMMERCIAL

## 2024-06-13 ENCOUNTER — APPOINTMENT (OUTPATIENT)
Dept: NEUROLOGY | Facility: HOSPITAL | Age: 59
End: 2024-06-13
Payer: COMMERCIAL

## 2024-06-14 ENCOUNTER — APPOINTMENT (OUTPATIENT)
Dept: PHARMACY | Facility: HOSPITAL | Age: 59
End: 2024-06-14
Payer: COMMERCIAL

## 2024-06-14 DIAGNOSIS — I10 HYPERTENSION, UNSPECIFIED TYPE: ICD-10-CM

## 2024-06-14 DIAGNOSIS — E11.9 TYPE 2 DIABETES MELLITUS WITHOUT COMPLICATION, WITHOUT LONG-TERM CURRENT USE OF INSULIN (MULTI): Primary | ICD-10-CM

## 2024-06-14 PROCEDURE — RXMED WILLOW AMBULATORY MEDICATION CHARGE

## 2024-06-14 ASSESSMENT — ENCOUNTER SYMPTOMS: VISUAL CHANGE: 1

## 2024-06-14 NOTE — PROGRESS NOTES
fPatient is sent at the request of Jacinto Willis MD for medication management.  My final recommendations will be communicated back to the requesting provider by way of shared medical record.    Subjective     Jimmie Dodd is a 58 y.o. year old male patient with controlled type two diabetes mellitus (A1c 5.4%), fatty liver, class II obesity (39.59  kg/m2), hypertension, SAMPSON, and hyperlipidemia. [facility] George completed updated labs on 3/5/2024 including a follow-up annual visit with his PCP. BMI has improved from class III to class II, hypertension well-controlled, A1c improved again to 5.4%, LDL improved to 84 mg/dL. Today we are following up to discuss Mounjaro titration from 10 mg to 12.5 mg.     Diabetes  He presents for his follow-up diabetic visit. He has type 2 diabetes mellitus. His disease course has been improving. There are no hypoglycemic associated symptoms. Associated symptoms include visual change. Symptoms are stable. Risk factors for coronary artery disease include diabetes mellitus, hypertension, male sex and obesity. He is compliant with treatment all of the time. His weight is stable. He never participates in exercise. His overall blood glucose range is 110-130 mg/dl. An ACE inhibitor/angiotensin II receptor blocker is being taken. He sees a podiatrist.Eye exam is current.     Allergies   Allergen Reactions   • Dog Dander Shortness of breath   • Apple Swelling   • Lisinopril Cough     Cough   • Tramadol Hives   • Trazodone Hcl Unknown   • Tree Nuts Swelling     Current BG monitoring regimen:   Patient is using: continuous glucose monitor - [place] 3                 Adverse Effects:   No reported adverse effects with increase to Mounjaro 12.5 mg   No belching, flatulence, diarrhea, vomiting     Weight Management:  Office Weight 5/10/2024 - 294 lb   Week of 6/9/2024: 296.1 lb (Home weight)  Goal: 275 lb    Objective     BP Readings from Last 5 Encounters:    05/10/24 131/81   24 122/70   11/10/23 158/89   23 151/83   23 146/86     Wt Readings from Last 5 Encounters:   05/10/24 134 kg (294 lb 6.4 oz)   24 134 kg (296 lb)   24 134 kg (296 lb)   11/10/23 146 kg (321 lb)   23 (!) 157 kg (345 lb 4 oz)     BMI Readings from Last 5 Encounters:   05/10/24 37.80 kg/m²   24 39.59 kg/m²   24 39.59 kg/m²   11/10/23 41.21 kg/m²   23 45.55 kg/m²     Diabetes Pharmacotherapy:    - Synjardy XR  mg: Take one tablet by mouth once daily   - Mounjaro 12.5 mg: Inject 12.5 mg under the skin once weekly on  (Doses Remainin; Injections completed to date 2)    Historical Diabetes Pharmacotherapy:   - Rybelsus 14 mg: Take one tablet by mouth once daily   - Metformin 500 mg BID    Hypertension Pharmacotherapy:  - Amlodipine/Olmesartan 10/20 mg: Take one tablet by mouth once daily  - Spironolactone 25 mg: Take one tablet by mouth once daily    Primary Prevention:   - Statin? No  - ACE-I/ARB? Yes   - Aspirin? No    Pertinent PMH Review:  - PMH of Pancreatitis: No  - PMH of Retinopathy: No  - PMH of Urinary Tract Infections: No  - PMH of MTC/MEN Type II: No    Labs:   Lab Results   Component Value Date    BILITOT 0.3 2024    CALCIUM 9.1 2024    CO2 24 2024     2024    CREATININE 0.92 2024    GLUCOSE 101 (H) 2024    ALKPHOS 56 2024    K 4.1 2024    PROT 6.4 2024     2024    AST 17 2024    ALT 20 2024    BUN 23 2024    ANIONGAP 17 2024    MG 2.20 2024    ALBUMIN 4.5 2024    GFRMALE >90 2023     Lab Results   Component Value Date    TRIG 220 (H) 2024    CHOL 170 2024    LDLCALC 87 2024    HDL 39.3 2024     Lab Results   Component Value Date    HGBA1C 5.4 2024    HGBA1C 6.6 (H) 11/10/2023    HGBA1C 7.6 (A) 2023     Component      Latest Ref Rng 11/10/2023 5/10/2024   Albumin, Urine  Random      Not established mg/L <7.0  8.5    Creatinine, Urine Random      20.0 - 370.0 mg/dL 18.1 (L)  103.4    Albumin/Creatine Ratio      <30.0 ug/mg Creat --  8.2      Health Maintenance:   Podiatry Exam: Reported pain in his feet - completed a podiatry visit. Advised to see physical therapy by podiatry.   Eye Exam: 11/1/2023 - No noted acute or chronic changes. Patient okay'd for Mounjaro dose increase  Lipid Panel: LDL - 111 mg/dL --> 87 mg/dL on 3/5/2024   Urine Albumin: WNL - 11/10/2023 --> 5/10/2024 WNL    Assessment/Plan   Problem List Items Addressed This Visit    None    Diabetes Pharmacotherapy: No reported gastrointestinal adverse effects with Mounjaro 12.5 mg therapy increased from Mounjaro 10 mg. Two injections completed to date, with the third to occur on the evening of 6/14/2024. Advised to maintain for a minimum of 3 months before consideration of Mounjaro 15 mg. CGM indicates well-controlled TDM2  Continue Mounjaro 12.5 mg once weekly   Continue Synjardy Xr 25/1000 mg daily  Hypertension Pharmacotherapy  Continue Shiva 10/20 mg and Spironolactone 25 mg daily  Hyperlipidemia Pharmacotherapy:   Not on statin therapy -  mg/dL on 11/10/2023 --> Improved to 87 mg/dL on 3/5/2024  According to the ADA and ACC/AHA guidelines, moderate-intensity statin and lifestyle modifications are recommended for all diabetic patients aged 40-75 without contraindication to statin therapy to achieve an LDL goal of less than 70 mg/dL.  Follow-up: 08/02/2024 at 8:30   [facility] follow up: 5/10/2024    Yaron Olivier, PharmD    Continue all meds under the continuation of care with the referring provider and clinical pharmacy team.

## 2024-06-17 ENCOUNTER — PHARMACY VISIT (OUTPATIENT)
Dept: PHARMACY | Facility: CLINIC | Age: 59
End: 2024-06-17
Payer: COMMERCIAL

## 2024-06-21 PROCEDURE — RXMED WILLOW AMBULATORY MEDICATION CHARGE

## 2024-06-25 ENCOUNTER — PHARMACY VISIT (OUTPATIENT)
Dept: PHARMACY | Facility: CLINIC | Age: 59
End: 2024-06-25
Payer: COMMERCIAL

## 2024-07-05 PROCEDURE — RXMED WILLOW AMBULATORY MEDICATION CHARGE

## 2024-07-10 ENCOUNTER — PHARMACY VISIT (OUTPATIENT)
Dept: PHARMACY | Facility: CLINIC | Age: 59
End: 2024-07-10
Payer: COMMERCIAL

## 2024-07-22 PROCEDURE — RXMED WILLOW AMBULATORY MEDICATION CHARGE

## 2024-07-23 ENCOUNTER — PHARMACY VISIT (OUTPATIENT)
Dept: PHARMACY | Facility: CLINIC | Age: 59
End: 2024-07-23
Payer: COMMERCIAL

## 2024-08-01 ENCOUNTER — APPOINTMENT (OUTPATIENT)
Dept: NEUROLOGY | Facility: HOSPITAL | Age: 59
End: 2024-08-01
Payer: COMMERCIAL

## 2024-08-02 ENCOUNTER — APPOINTMENT (OUTPATIENT)
Dept: PHARMACY | Facility: HOSPITAL | Age: 59
End: 2024-08-02
Payer: COMMERCIAL

## 2024-08-04 PROCEDURE — RXMED WILLOW AMBULATORY MEDICATION CHARGE

## 2024-08-06 PROCEDURE — RXMED WILLOW AMBULATORY MEDICATION CHARGE

## 2024-08-07 ENCOUNTER — PHARMACY VISIT (OUTPATIENT)
Dept: PHARMACY | Facility: CLINIC | Age: 59
End: 2024-08-07
Payer: COMMERCIAL

## 2024-08-09 ENCOUNTER — TELEMEDICINE (OUTPATIENT)
Dept: PHARMACY | Facility: HOSPITAL | Age: 59
End: 2024-08-09
Payer: COMMERCIAL

## 2024-08-09 DIAGNOSIS — E11.9 TYPE 2 DIABETES MELLITUS WITHOUT COMPLICATION, WITHOUT LONG-TERM CURRENT USE OF INSULIN (MULTI): Primary | ICD-10-CM

## 2024-08-09 DIAGNOSIS — I10 HYPERTENSION, UNSPECIFIED TYPE: ICD-10-CM

## 2024-08-09 ASSESSMENT — ENCOUNTER SYMPTOMS: VISUAL CHANGE: 1
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
## 2024-08-14 PROCEDURE — RXMED WILLOW AMBULATORY MEDICATION CHARGE

## 2024-08-15 ENCOUNTER — PHARMACY VISIT (OUTPATIENT)
Dept: PHARMACY | Facility: CLINIC | Age: 59
End: 2024-08-15
Payer: COMMERCIAL

## 2024-08-15 ENCOUNTER — HOSPITAL ENCOUNTER (OUTPATIENT)
Dept: NEUROLOGY | Facility: HOSPITAL | Age: 59
Discharge: HOME | End: 2024-08-15
Payer: COMMERCIAL

## 2024-08-15 DIAGNOSIS — M54.16 LUMBAR NEURITIS: ICD-10-CM

## 2024-08-15 PROCEDURE — 95886 MUSC TEST DONE W/N TEST COMP: CPT | Performed by: PSYCHIATRY & NEUROLOGY

## 2024-08-15 PROCEDURE — 95908 NRV CNDJ TST 3-4 STUDIES: CPT | Performed by: PSYCHIATRY & NEUROLOGY

## 2024-08-24 DIAGNOSIS — M54.16 LUMBAR NEURITIS: Primary | ICD-10-CM

## 2024-08-24 DIAGNOSIS — G57.31 NEUROPATHY OF RIGHT SUPERFICIAL PERONEAL NERVE: ICD-10-CM

## 2024-09-05 PROCEDURE — RXMED WILLOW AMBULATORY MEDICATION CHARGE

## 2024-09-09 ENCOUNTER — PHARMACY VISIT (OUTPATIENT)
Dept: PHARMACY | Facility: CLINIC | Age: 59
End: 2024-09-09
Payer: COMMERCIAL

## 2024-09-10 DIAGNOSIS — E11.9 TYPE 2 DIABETES MELLITUS WITHOUT COMPLICATION, WITHOUT LONG-TERM CURRENT USE OF INSULIN (MULTI): ICD-10-CM

## 2024-09-10 RX ORDER — BLOOD-GLUCOSE SENSOR
1 EACH MISCELLANEOUS
Qty: 6 EACH | Refills: 3 | Status: SHIPPED | OUTPATIENT
Start: 2024-09-10

## 2024-09-12 DIAGNOSIS — I10 HYPERTENSION, UNSPECIFIED TYPE: ICD-10-CM

## 2024-09-29 PROCEDURE — RXMED WILLOW AMBULATORY MEDICATION CHARGE

## 2024-10-01 ENCOUNTER — PHARMACY VISIT (OUTPATIENT)
Dept: PHARMACY | Facility: CLINIC | Age: 59
End: 2024-10-01
Payer: COMMERCIAL

## 2024-10-02 ENCOUNTER — PHARMACY VISIT (OUTPATIENT)
Dept: PHARMACY | Facility: CLINIC | Age: 59
End: 2024-10-02
Payer: COMMERCIAL

## 2024-10-17 ENCOUNTER — APPOINTMENT (OUTPATIENT)
Dept: NEUROLOGY | Facility: HOSPITAL | Age: 59
End: 2024-10-17
Payer: COMMERCIAL

## 2024-10-24 PROCEDURE — RXMED WILLOW AMBULATORY MEDICATION CHARGE

## 2024-10-26 ENCOUNTER — PHARMACY VISIT (OUTPATIENT)
Dept: PHARMACY | Facility: CLINIC | Age: 59
End: 2024-10-26
Payer: COMMERCIAL

## 2024-11-08 DIAGNOSIS — E11.9 TYPE 2 DIABETES MELLITUS WITHOUT COMPLICATION, WITHOUT LONG-TERM CURRENT USE OF INSULIN (MULTI): Primary | ICD-10-CM

## 2024-11-08 DIAGNOSIS — I10 HYPERTENSION, UNSPECIFIED TYPE: ICD-10-CM

## 2024-11-22 PROCEDURE — RXMED WILLOW AMBULATORY MEDICATION CHARGE

## 2024-11-23 ENCOUNTER — PHARMACY VISIT (OUTPATIENT)
Dept: PHARMACY | Facility: CLINIC | Age: 59
End: 2024-11-23
Payer: COMMERCIAL

## 2024-11-25 ENCOUNTER — PHARMACY VISIT (OUTPATIENT)
Dept: PHARMACY | Facility: CLINIC | Age: 59
End: 2024-11-25
Payer: COMMERCIAL

## 2024-11-26 ENCOUNTER — PHARMACY VISIT (OUTPATIENT)
Dept: PHARMACY | Facility: CLINIC | Age: 59
End: 2024-11-26

## 2024-12-14 PROCEDURE — RXMED WILLOW AMBULATORY MEDICATION CHARGE

## 2024-12-16 DIAGNOSIS — E11.9 TYPE 2 DIABETES MELLITUS WITHOUT COMPLICATION, WITHOUT LONG-TERM CURRENT USE OF INSULIN (MULTI): Primary | ICD-10-CM

## 2024-12-16 DIAGNOSIS — I10 HYPERTENSION, UNSPECIFIED TYPE: ICD-10-CM

## 2024-12-17 ENCOUNTER — PHARMACY VISIT (OUTPATIENT)
Dept: PHARMACY | Facility: CLINIC | Age: 59
End: 2024-12-17
Payer: COMMERCIAL

## 2024-12-19 PROCEDURE — RXMED WILLOW AMBULATORY MEDICATION CHARGE

## 2024-12-20 DIAGNOSIS — I10 HYPERTENSION, UNSPECIFIED TYPE: ICD-10-CM

## 2024-12-20 PROCEDURE — RXMED WILLOW AMBULATORY MEDICATION CHARGE

## 2024-12-23 RX ORDER — AMLODIPINE AND OLMESARTAN MEDOXOMIL 10; 20 MG/1; MG/1
1 TABLET ORAL DAILY
Qty: 90 TABLET | Refills: 3 | Status: SHIPPED | OUTPATIENT
Start: 2024-12-23

## 2024-12-26 ENCOUNTER — PHARMACY VISIT (OUTPATIENT)
Dept: PHARMACY | Facility: CLINIC | Age: 59
End: 2024-12-26
Payer: COMMERCIAL

## 2024-12-27 PROCEDURE — RXMED WILLOW AMBULATORY MEDICATION CHARGE

## 2025-01-02 ENCOUNTER — PHARMACY VISIT (OUTPATIENT)
Dept: PHARMACY | Facility: CLINIC | Age: 60
End: 2025-01-02
Payer: COMMERCIAL

## 2025-01-08 DIAGNOSIS — E11.9 TYPE 2 DIABETES MELLITUS WITHOUT COMPLICATION, WITHOUT LONG-TERM CURRENT USE OF INSULIN (MULTI): ICD-10-CM

## 2025-01-14 RX ORDER — SPIRONOLACTONE 25 MG/1
25 TABLET ORAL DAILY
Qty: 90 TABLET | Refills: 0 | Status: SHIPPED | OUTPATIENT
Start: 2025-01-14

## 2025-01-15 PROCEDURE — RXMED WILLOW AMBULATORY MEDICATION CHARGE

## 2025-01-16 ENCOUNTER — PHARMACY VISIT (OUTPATIENT)
Dept: PHARMACY | Facility: CLINIC | Age: 60
End: 2025-01-16
Payer: COMMERCIAL

## 2025-01-29 PROCEDURE — RXMED WILLOW AMBULATORY MEDICATION CHARGE

## 2025-01-30 PROCEDURE — RXMED WILLOW AMBULATORY MEDICATION CHARGE

## 2025-01-31 ENCOUNTER — PHARMACY VISIT (OUTPATIENT)
Dept: PHARMACY | Facility: CLINIC | Age: 60
End: 2025-01-31
Payer: COMMERCIAL

## 2025-02-07 ENCOUNTER — APPOINTMENT (OUTPATIENT)
Dept: PHARMACY | Facility: HOSPITAL | Age: 60
End: 2025-02-07
Payer: COMMERCIAL

## 2025-02-07 ENCOUNTER — PHARMACY VISIT (OUTPATIENT)
Dept: PHARMACY | Facility: CLINIC | Age: 60
End: 2025-02-07
Payer: COMMERCIAL

## 2025-02-07 DIAGNOSIS — E11.9 TYPE 2 DIABETES MELLITUS WITHOUT COMPLICATION, WITHOUT LONG-TERM CURRENT USE OF INSULIN (MULTI): Primary | ICD-10-CM

## 2025-02-07 DIAGNOSIS — I10 HYPERTENSION, UNSPECIFIED TYPE: ICD-10-CM

## 2025-02-07 PROCEDURE — RXMED WILLOW AMBULATORY MEDICATION CHARGE

## 2025-02-07 RX ORDER — BLOOD-GLUCOSE SENSOR
EACH MISCELLANEOUS
Qty: 6 EACH | Refills: 3 | Status: SHIPPED | OUTPATIENT
Start: 2025-02-07

## 2025-02-07 ASSESSMENT — ENCOUNTER SYMPTOMS
VISUAL CHANGE: 0
WEAKNESS: 0
BLURRED VISION: 0
WEIGHT LOSS: 0
POLYPHAGIA: 0
FATIGUE: 0
POLYDIPSIA: 0
DIABETIC ASSOCIATED SYMPTOMS: 0

## 2025-02-07 NOTE — Clinical Note
Advised patient to schedule [facility] follow up if appropriate. Message sent to Tayler Miguel. Repeat laboratory studies placed. Patient must complete follow up in May 2025 or sooner to continue follow up with pharmacy.

## 2025-02-07 NOTE — PROGRESS NOTES
fPatient is sent at the request of Jacinto Willis MD for medication management.  My final recommendations will be communicated back to the requesting provider by way of shared medical record.    Subjective     Jimmie Dodd is a 59 y.o. year old male patient with controlled type two diabetes mellitus (A1c 5.4%), fatty liver, class II obesity (39.59  kg/m2), hypertension, SAMPSON, and hyperlipidemia. Patient follows through the [facility]. He is prescribed Mounjaro 12.5 mg once weekly and Synjardy Xr 25/1000 mg once daily in the setting of type two diabetes mellitus. He is prescribed spironolactone 25 mg daily and amlodipine/olmesartan 10/20 mg once daily in the setting of hypertension. He utilizes Freestyle Clayton 3 for continuous glucose monitoring.     Diabetes  He presents for his follow-up diabetic visit. He has type 2 diabetes mellitus. His disease course has been improving. There are no hypoglycemic associated symptoms. There are no diabetic associated symptoms. Pertinent negatives for diabetes include no blurred vision, no chest pain, no fatigue, no foot paresthesias, no foot ulcerations, no polydipsia, no polyphagia, no polyuria, no visual change, no weakness and no weight loss. There are no hypoglycemic complications. Symptoms are stable. Risk factors for coronary artery disease include diabetes mellitus, hypertension, male sex and obesity. He is compliant with treatment all of the time. His weight is stable. He never participates in exercise. There is no change in his home blood glucose trend. His overall blood glucose range is 110-130 mg/dl. An ACE inhibitor/angiotensin II receptor blocker is being taken. He sees a podiatrist.Eye exam is current.     Allergies   Allergen Reactions    Dog Dander Shortness of breath    Apple Swelling    Lisinopril Cough     Cough    Tramadol Hives    Trazodone Hcl Unknown    Tree Nuts Swelling     Interval Hx:   He overall feels good and is happy with his  progress  He has reported low glucose alarms that have woke him up from his sleep. He has raised his blood glucose with concentrated sugars or carbohydrate sources when this occurs.  He denies hypoglycemia.   He donated blood yesterday   No new changes regarding diet     Physical Activity:   Walking   ADLs  Injured his left knee and was limited on walking for 6-8 weeks. He was wearing a knee brace during this time.   He now reports that his knee is much better.   Member of a gym: Yes ([facility])  He does not have a regimented exercise program     Diabetes Pharmacotherapy:    Synjardy XR 25/1000 mg: Take one tablet by mouth once daily   Mounjaro 12.5 mg: Inject 12.5 mg under the skin once weekly on Fridays     Historical Diabetes Pharmacotherapy:   Rybelsus 14 mg: Take one tablet by mouth once daily   Metformin 500 mg BID    Hypertension Pharmacotherapy:  Amlodipine/Olmesartan 10/20 mg: Take one tablet by mouth once daily  Spironolactone 25 mg: Take one tablet by mouth once daily    Current BG monitoring regimen:   Patient is using: continuous glucose monitor - Clayton 3           Previous [facility] Report:                 Home Blood Pressure Monitoring:   Upper arm blood pressure monitor  Monitoring frequency: PRN    2/6/2025: 130/76 mmHg ([facility] visit)    Primary Prevention:   - Statin? No  - ACE-I/ARB? Yes   - Aspirin? No    Pertinent PMH Review:  - PMH of Pancreatitis: No  - PMH of Retinopathy: No  - PMH of Urinary Tract Infections: No  - PMH of MTC/MEN Type II: No    Weight Management:  Office Weight - 294 lb (5/10/2024)  Week of 6/9/2024: 296.1 lb (Home weight)  2/7/2025: 293 lb (Home weight)    Goal: 275 lb    Adverse Effects:   No reported adverse effects with increase to Mounjaro 12.5 mg   No belching, flatulence, diarrhea, vomiting     Objective     BP Readings from Last 5 Encounters:   05/10/24 131/81   03/05/24 122/70   11/10/23 158/89   07/14/23 151/83   05/25/23 146/86     Wt Readings from Last 5  Encounters:   05/10/24 134 kg (294 lb 6.4 oz)   03/05/24 134 kg (296 lb)   03/05/24 134 kg (296 lb)   11/10/23 146 kg (321 lb)   07/14/23 (!) 157 kg (345 lb 4 oz)     BMI Readings from Last 5 Encounters:   05/10/24 37.80 kg/m²   03/05/24 39.59 kg/m²   03/05/24 39.59 kg/m²   11/10/23 41.21 kg/m²   07/14/23 45.55 kg/m²     Labs:   Lab Results   Component Value Date    BILITOT 0.3 03/05/2024    CALCIUM 9.1 03/05/2024    CO2 24 03/05/2024     03/05/2024    CREATININE 0.92 03/05/2024    GLUCOSE 101 (H) 03/05/2024    ALKPHOS 56 03/05/2024    K 4.1 03/05/2024    PROT 6.4 03/05/2024     03/05/2024    AST 17 03/05/2024    ALT 20 03/05/2024    BUN 23 03/05/2024    ANIONGAP 17 03/05/2024    MG 2.20 03/05/2024    ALBUMIN 4.5 03/05/2024    GFRMALE >90 07/14/2023     Lab Results   Component Value Date    TRIG 220 (H) 03/05/2024    CHOL 170 03/05/2024    LDLCALC 87 03/05/2024    HDL 39.3 03/05/2024     Lab Results   Component Value Date    HGBA1C 5.4 03/05/2024    HGBA1C 6.6 (H) 11/10/2023    HGBA1C 7.6 (A) 03/23/2023     Component      Latest Ref Rng 7/14/2023 11/10/2023 5/10/2024   ALBUMIN (MG/L) IN URINE      Not Established mg/L <7.0      Albumin/Creatinine Ratio      <30.0 ug/mg Creat SEE COMMENT  --  8.2    Creatinine, Urine Random      20.0 - 370.0 mg/dL 37.0  18.1 (L)  103.4    Albumin, Urine Random      Not established mg/L  <7.0  8.5       Legend:  (L) Low    Health Maintenance:   Lipid Panel: LDL - 111 mg/dL --> 87 mg/dL on 3/5/2024   Urine Albumin: WNL - 11/10/2023 --> 5/10/2024 WNL    Assessment/Plan   Problem List Items Addressed This Visit       DM type 2 (diabetes mellitus, type 2) (Multi) - Primary     Patients diabetes is controlled with most recent A1c of 5.4% on 3/5/2024. Baseline at 7.6% on 3/23/2023. Advised to continue therapy with Mounjaro 12.5 mg once weekly and Synjardy Xr 25/1000 mg once daily. Continue Clayton 3 continuous glucose monitoring. Freestyle Clayton 14, 30 and 90 day reports reviewed.  His 90 day glucose report indicates 99% time in target with 1% above goal. Average glucose at 124 mg/dL and GMI of 6.3%. He does not regularly participate in physical activity or exercise. Discussed targeting 150 minutes of exercise weekly. Weight is stable at 293 lb. Patient target goal 275 lb. Advised to schedule follow up with [facility] clinic and complete repeat laboratory studies. According to the ADA and ACC/AHA guidelines, moderate-intensity statin and lifestyle modifications are recommended for all diabetic patients aged 40-75 without contraindication to statin therapy to achieve an LDL goal of less than 70 mg/dL. Complete routine follow-up with podiatry and ophthalmology.          Relevant Medications    blood-glucose sensor (FreeStyle Clayton 3 Plus Sensor) device    Other Relevant Orders    Referral to Clinical Pharmacy    High blood pressure     Home blood pressure recordings not available for today's encounter. Reports blood pressure at 130/76 mmHg at [place] blood donation event. Continue Amlodipine-Olmesartan 10/20 mg once daily and Spironolactone 25 mg once daily.          Relevant Orders    Referral to Clinical Pharmacy     Follow-up: 05/09/2025 at 10 am  [facility] follow up: Not Scheduled ; Last follow up completed on 5/10/2024     Yaron Olivier, PharmD    Continue all meds under the continuation of care with the referring provider and clinical pharmacy team.

## 2025-02-08 ENCOUNTER — PHARMACY VISIT (OUTPATIENT)
Dept: PHARMACY | Facility: CLINIC | Age: 60
End: 2025-02-08

## 2025-02-08 NOTE — ASSESSMENT & PLAN NOTE
Patients diabetes is controlled with most recent A1c of 5.4% on 3/5/2024. Baseline at 7.6% on 3/23/2023. Advised to continue therapy with Mounjaro 12.5 mg once weekly and Synjardy Xr 25/1000 mg once daily. Continue Clayton 3 continuous glucose monitoring. Freestyle Clayton 14, 30 and 90 day reports reviewed. His 90 day glucose report indicates 99% time in target with 1% above goal. Average glucose at 124 mg/dL and GMI of 6.3%. He does not regularly participate in physical activity or exercise. Discussed targeting 150 minutes of exercise weekly. Weight is stable at 293 lb. Patient target goal 275 lb. Advised to schedule follow up with [facility] clinic and complete repeat laboratory studies. According to the ADA and ACC/AHA guidelines, moderate-intensity statin and lifestyle modifications are recommended for all diabetic patients aged 40-75 without contraindication to statin therapy to achieve an LDL goal of less than 70 mg/dL. Complete routine follow-up with podiatry and ophthalmology.

## 2025-02-08 NOTE — ASSESSMENT & PLAN NOTE
Home blood pressure recordings not available for today's encounter. Reports blood pressure at 130/76 mmHg at [place] blood donation event. Continue Amlodipine-Olmesartan 10/20 mg once daily and Spironolactone 25 mg once daily.

## 2025-03-06 PROCEDURE — RXMED WILLOW AMBULATORY MEDICATION CHARGE

## 2025-03-07 ENCOUNTER — PHARMACY VISIT (OUTPATIENT)
Dept: PHARMACY | Facility: CLINIC | Age: 60
End: 2025-03-07
Payer: COMMERCIAL

## 2025-03-14 PROCEDURE — RXMED WILLOW AMBULATORY MEDICATION CHARGE

## 2025-03-17 ENCOUNTER — PHARMACY VISIT (OUTPATIENT)
Dept: PHARMACY | Facility: CLINIC | Age: 60
End: 2025-03-17
Payer: COMMERCIAL

## 2025-04-03 DIAGNOSIS — I10 HYPERTENSION, UNSPECIFIED TYPE: ICD-10-CM

## 2025-04-03 DIAGNOSIS — E11.9 TYPE 2 DIABETES MELLITUS WITHOUT COMPLICATION, WITHOUT LONG-TERM CURRENT USE OF INSULIN: ICD-10-CM

## 2025-04-03 PROCEDURE — RXMED WILLOW AMBULATORY MEDICATION CHARGE

## 2025-04-03 RX ORDER — EMPAGLIFLOZIN, METFORMIN HYDROCHLORIDE 25; 1000 MG/1; MG/1
1 TABLET, EXTENDED RELEASE ORAL DAILY
Qty: 90 TABLET | Refills: 3 | Status: SHIPPED | OUTPATIENT
Start: 2025-04-03 | End: 2026-04-03

## 2025-04-04 ENCOUNTER — PHARMACY VISIT (OUTPATIENT)
Dept: PHARMACY | Facility: CLINIC | Age: 60
End: 2025-04-04
Payer: COMMERCIAL

## 2025-04-05 PROCEDURE — RXMED WILLOW AMBULATORY MEDICATION CHARGE

## 2025-04-10 ENCOUNTER — PHARMACY VISIT (OUTPATIENT)
Dept: PHARMACY | Facility: CLINIC | Age: 60
End: 2025-04-10
Payer: COMMERCIAL

## 2025-04-14 ENCOUNTER — TELEPHONE (OUTPATIENT)
Dept: CARDIOLOGY | Facility: HOSPITAL | Age: 60
End: 2025-04-14
Payer: COMMERCIAL

## 2025-04-16 LAB
ALBUMIN SERPL-MCNC: 4.6 G/DL (ref 3.6–5.1)
ALBUMIN/CREAT UR: 10 MG/G CREAT
ALP SERPL-CCNC: 54 U/L (ref 35–144)
ALT SERPL-CCNC: 25 U/L (ref 9–46)
ANION GAP SERPL CALCULATED.4IONS-SCNC: 9 MMOL/L (CALC) (ref 7–17)
AST SERPL-CCNC: 23 U/L (ref 10–35)
BILIRUB SERPL-MCNC: 0.6 MG/DL (ref 0.2–1.2)
BUN SERPL-MCNC: 24 MG/DL (ref 7–25)
CALCIUM SERPL-MCNC: 9.4 MG/DL (ref 8.6–10.3)
CHLORIDE SERPL-SCNC: 105 MMOL/L (ref 98–110)
CHOLEST SERPL-MCNC: 153 MG/DL
CHOLEST/HDLC SERPL: 3.2 (CALC)
CO2 SERPL-SCNC: 26 MMOL/L (ref 20–32)
CREAT SERPL-MCNC: 0.87 MG/DL (ref 0.7–1.3)
CREAT UR-MCNC: 96 MG/DL (ref 20–320)
EGFRCR SERPLBLD CKD-EPI 2021: 101 ML/MIN/1.73M2
EST. AVERAGE GLUCOSE BLD GHB EST-MCNC: 123 MG/DL
EST. AVERAGE GLUCOSE BLD GHB EST-SCNC: 6.8 MMOL/L
GLUCOSE SERPL-MCNC: 124 MG/DL (ref 65–99)
HBA1C MFR BLD: 5.9 %
HDLC SERPL-MCNC: 48 MG/DL
LDLC SERPL CALC-MCNC: 85 MG/DL (CALC)
MICROALBUMIN UR-MCNC: 1 MG/DL
NONHDLC SERPL-MCNC: 105 MG/DL (CALC)
POTASSIUM SERPL-SCNC: 4 MMOL/L (ref 3.5–5.3)
PROT SERPL-MCNC: 6.6 G/DL (ref 6.1–8.1)
SODIUM SERPL-SCNC: 140 MMOL/L (ref 135–146)
TRIGL SERPL-MCNC: 108 MG/DL

## 2025-04-18 ENCOUNTER — APPOINTMENT (OUTPATIENT)
Dept: CARDIOLOGY | Facility: HOSPITAL | Age: 60
End: 2025-04-18
Payer: COMMERCIAL

## 2025-04-18 NOTE — PROGRESS NOTES
"Dear Dr. Song Assigned PCP Generic Provider, MD    It was a pleasure to meet with Jimmie Dodd, a 59 y.o. male, who presents for a 18-month follow-up evaluation in the [facility] program.     Reason for Visit: No chief complaint on file.         PROBLEM LIST  1. T2DM/IR. LDL 59; VLDL 55; NHDL 114. On Met+SGLT2i. Echo demonstrates normal LV function. No evidence of diastolic dysfunction. HbA1c 5.9     2. Hypertension. Valsartan and Spironolactone     3. HFPEF. ACC/AHA Stage B. NYYA Class 2.     4. NAFLD. Fibroscan positive for Cirrhosis. FIB-4 was 1.113     5. Hyperlipidemia.  Most recent total cholesterol is 153, HDL of 48 and LDL of 55. Non HDL of 100     6. 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 IWMI pattern on EKG     Zero CAC 2022    IMPRESSION AND FOLLOW-UP        History of Present Illness: Jimmie Dodd is a 59 y.o. male who presents for a follow-up evaluation in the [facility] program.     Patient deniesnone.      Metabolic Parameters  Lab Results   Component Value Date    LDLCALC 85 04/15/2025    LDLCALC 87 03/05/2024    LDLCALC 111 (H) 11/10/2023     Lab Results   Component Value Date    HGBA1C 5.9 (H) 04/15/2025    HGBA1C 5.4 03/05/2024    HGBA1C 6.6 (H) 11/10/2023     No results found for: \"WEIGHT\"    Lifestyle  Diet:   Physical Activity:   Sleep:       Objective   Social Hx:   He reports that he has never smoked. He has never used smokeless tobacco. No history on file for alcohol use and drug use.  Family Hx:   His family history includes Aortic aneurysm in an other family member; Colon cancer in some other family members; Diabetes type II in his mother; Hodgkin's lymphoma in his mother; Hypertension in his father; cardiac disorder in an other family member; lipid disorder in his father.   Medical History:   He@PMHP   Allergies:   RX Allergies[1]     Exam:            5/10/2024    11:21 AM 4/9/2024     3:41 PM 3/5/2024    11:07 AM 3/5/2024     8:53 AM 11/10/2023    10:36 AM 7/14/2023     9:50 " "AM   Vitals   Systolic 131   122 158 151   Diastolic 81   70 89 83   BP Location Left arm    Left arm    Heart Rate 95   80 85 79   Temp  36.7 °C (98 °F)       Resp      18   Height 1.88 m (6' 2\")  1.842 m (6' 0.5\") 1.842 m (6' 0.5\") 1.88 m (6' 2\") 1.854 m (6' 1\")   Weight (lb) 294.4  296 296 321 345.25   BMI 37.8 kg/m2  39.59 kg/m2 39.59 kg/m2 41.21 kg/m2 45.55 kg/m2   BSA (m2) 2.65 m2  2.62 m2 2.62 m2 2.76 m2 2.84 m2   Visit Report Report Report Report Report Report      Wt Readings from Last 5 Encounters:   05/10/24 134 kg (294 lb 6.4 oz)   03/05/24 134 kg (296 lb)   03/05/24 134 kg (296 lb)   11/10/23 146 kg (321 lb)   07/14/23 (!) 157 kg (345 lb 4 oz)     BMI Readings from Last 5 Encounters:   05/10/24 37.80 kg/m²   03/05/24 39.59 kg/m²   03/05/24 39.59 kg/m²   11/10/23 41.21 kg/m²   07/14/23 45.55 kg/m²      BP Readings from Last 5 Encounters:   05/10/24 131/81   03/05/24 122/70   11/10/23 158/89   07/14/23 151/83   05/25/23 146/86       GEN: Pleasant, well-appearing, no acute distress.  HEENT: JVP not elevated, no icterus.  CHEST: No wheeze, good air movement.  CV: Normal rate, regular rhythm. Normal S1, S2. No m/r/g  ABD: Soft, ND, NT  EXT: Warm, No LE edema.       Labs:   CMP:  Recent Labs     04/15/25  1014 03/05/24  0922 11/10/23  1021 07/14/23  1130 03/23/23  0948    139 138 139 137   K 4.0 4.1 3.7 4.0 3.9    102 97* 101 101   CO2 26 24 30 27 26   ANIONGAP 9 17 15 15 14   BUN 24 23 22 18 17   CREATININE 0.87 0.92 0.95 0.81 0.91   EGFR 101 >90 >90  --   --    MG  --  2.20  --   --  2.00     Recent Labs     04/15/25  1014 03/05/24  0922 11/10/23  1021   ALBUMIN 4.6 4.5 4.6   ALKPHOS 54 56 48   ALT 25 20 27   AST 23 17 18   BILITOT 0.6 0.3 0.7   CBC:@CVLABRCNT(WBC:5,HGB:5,HCT:5,PLT:5,MCV:5) CARDIAC: No results for input(s): \"LDH\", \"CKMB\", \"TROPHS\", \"BNP\" in the last 91508 hours.    No lab exists for component: \"CK\", \"CKMBP\"  Recent Labs     04/15/25  1014 05/10/24  1103 03/05/24  0922 " "11/10/23  1021 07/14/23  1130 03/23/23  0948 07/21/22  0821 01/19/22  1042   HGBA1C 5.9*  --  5.4 6.6*  --  7.6*   < > 8.2*   CHOL 153  --  170 193 164 144  --  137   LDLF  --   --   --   --  72 59  --  44   LDLCALC 85  --  87 111*  --   --   --   --    HDL 48  --  39.3 40.7 35.8* 30.0*  --  29.8*   TRIG 108  --  220* 205* 283* 273*  --  315*   LIPOA  --   --  <6  --  <6 <6  --  <8.4   LDLP  --  1149*  --   --  1,341*  --   --   --    SMLDLP  --  361  --   --  1,036*  --   --   --     < > = values in this interval not displayed.        Notable Studies:   EKG:   Recent Labs     07/14/23  0944   VENTRATE 78   ATRRATE 78   QTCFRED 406   QRSDUR 106   QTCCALCB 424   No results found for this or any previous visit (from the past 4464 hours).  Echocardiogram: No results for input(s): \"EF\", \"LVIDD\", \"IVS\", \"RV\", \"RVFRWALLPKSP\", \"TAPSE\" in the last 48154 hours.  Echocardiogram     Narrative  [facility]  [address], [address], [place] [address]  Tel [phone]    TRANSTHORACIC ECHOCARDIOGRAM REPORT      Patient Name:     CJ TAMMIE MEDINA Reading Physician:   [id number] Jose Alfredo Xie MD  Study Date:       5/30/2023         Referring Physician: YOVANNY RUIZ  MRN/PID:          [id number]          PCP:  Accession/Order#: [id number]      Department Location: [facility]  YOB: 1965         Fellow:  Gender:           M                 Nurse:  Admit Date:                         Sonographer:         Svetlana Cisneros FLORINA  Admission Status: Outpatient        Additional Staff:  Height:           185.42 cm         CC Report to:  Weight:           158.31 kg         Study Type:          Echocardiogram  BSA:              2.73 m2  Blood Pressure: 146 /74 mmHg    Diagnosis/ICD: R07.9-Chest pain, unspecified  Indication:    Chest Pain  Procedure/CPT: Echo Complete w Full Doppler-[id number]  Study Detail: The following Echo studies were performed: 2D, M-Mode, Doppler and  color flow.      PHYSICIAN " INTERPRETATION:  Left Ventricle: The left ventricular systolic function is normal, with an estimated ejection fraction of 60-65%. There are no regional wall motion abnormalities. The left ventricular cavity size is normal. Spectral Doppler shows a normal pattern of left ventricular diastolic filling.  Left Atrium: The left atrium is normal in size.  Right Ventricle: The right ventricle is normal in size. There is normal right ventricular global systolic function.  Right Atrium: The right atrium is normal in size.  Aortic Valve: The aortic valve is trileaflet. There is no evidence of aortic valve regurgitation.  Mitral Valve: The mitral valve is normal in structure. There is no evidence of mitral valve regurgitation.  Tricuspid Valve: The tricuspid valve is structurally normal. No evidence of tricuspid regurgitation. The right ventricular systolic pressure is unable to be estimated.  Pulmonic Valve: The pulmonic valve is not well visualized. There is physiologic pulmonic valve regurgitation.  Pericardium: There is no pericardial effusion noted.  Aorta: The aortic root is normal.  Systemic Veins: The inferior vena cava was not well visualized.  Additional Comments: Normal Echocardiogram.  In comparison to the previous echocardiogram(s): There are no prior studies on this patient for comparison purposes.      CONCLUSIONS:  1. Normal Echocardiogram.  2. Left ventricular systolic function is normal with a 60-65% estimated ejection fraction.    QUANTITATIVE DATA SUMMARY:  2D MEASUREMENTS:  Normal Ranges:  Ao Root d:     3.20 cm   (2.0-3.7cm)  IVSd:          1.10 cm   (0.6-1.1cm)  LVPWd:         0.90 cm   (0.6-1.1cm)  LVIDd:         5.50 cm   (3.9-5.9cm)  LVIDs:         3.90 cm  LV Mass Index: 78.2 g/m2  LV % FS        29.1 %    LA VOLUME:  Normal Ranges:  LA Vol A4C:        62.6 ml    (22+/-6mL/m2)  LA Vol A2C:        57.3 ml  LA Vol BP:         63.3 ml  LA Vol Index A4C:  23.0ml/m2  LA Vol Index A2C:  21.0 ml/m2  LA Vol  Index BP:   23.2 ml/m2  LA Area A4C:       21.9 cm2  LA Area A2C:       19.8 cm2  LA Major Axis A4C: 6.5 cm  LA Major Axis A2C: 5.8 cm    RA VOLUME BY A/L METHOD:  Normal Ranges:  RA Area A4C: 16.7 cm2    AORTA MEASUREMENTS:  Normal Ranges:  Asc Ao, d: 3.20 cm (2.1-3.4cm)    LV SYSTOLIC FUNCTION BY 2D PLANIMETRY (MOD):  Normal Ranges:  EF-A4C View: 69.4 % (>=55%)  EF-A2C View: 54.4 %  EF-Biplane:  61.3 %    LV DIASTOLIC FUNCTION:  Normal Ranges:  MV Peak E:    1.13 m/s   (0.7-1.2 m/s)  MV Peak A:    0.98 m/s   (0.42-0.7 m/s)  E/A Ratio:    1.16       (1.0-2.2)  MV e'         0.10 m/s   (>8.0)  MV lateral e' 0.11 m/s  MV medial e'  0.10 m/s  MV A Dur:     79.00 msec  E/e' Ratio:   10.76      (<8.0)    MITRAL VALVE:  Normal Ranges:  MV DT: 234 msec (150-240msec)    AORTIC VALVE:  Normal Ranges:  LVOT Diameter: 2.00 cm (1.8-2.4cm)    RIGHT VENTRICLE:  RV 1   4.1 cm  RV 2   3.3 cm  RV 3   9.5 cm  TAPSE: 32.6 mm      [id number] Jose Alfredo Xie MD  Electronically signed on 5/30/2023 at 11:25:22 AM        Coronary Angiography: No results found for this or any previous visit from the past 1800 days.    Cardiac Scoring:   CT HEART CALCIUM SCORING WO IV CONTRAST 01/19/2022    Narrative  MRN: [id number]  Patient Name: CJ MEDINA    STUDY:  CT CARDIAC SCORING;  1/19/2022 11:33 am    INDICATION:  EXECUTIVEECHO1, VASC1,VASC4,, DCH02 STAT.    COMPARISON:  None.    ACCESSION NUMBER(S):  [id number]    ORDERING CLINICIAN:  YOVANNY RUIZ    TECHNIQUE:  Using prospective ECG gating, CT scan of the coronary arteries was  performed without intravenous contrast. Coronary calcium scoring  was  performed according to the method of Agatston.    FINDINGS:  The score and distribution of calcium in the coronary arteries is as  follows:    LM            0  LAD           0  LCx           0  RCA           0    Total          0      The heart size is normal and there is no pericardial effusion. The  chest vasculature is unremarkable.There is no  "significant mediastinal  or hilar adenopathy.    The visualized lungs are clear. The visualized upper abdominal  contents are within normal limits.  The bony structures are intact.    Impression  Coronary artery calcium score of  0.  Please note that up to 5% of patients with a negative exam still have  significant noncalcified plaque, and therefore have a falsely \"  negative\" CT coronary calcium score exam.    Coronary artery calcium scoring may be helpful in predicting the risk  for future coronary heart disease events.  According to the American  College of Cardiology Foundation Clinical Expert Consensus Task  Force, such testing provides important prognostic information in  patients with more than one coronary heart disease risk factor. The  coronary artery calcium score correlates with the annual risk of a  non-fatal myocardial infarction or coronary heart disease death.    Coronary artery score            Annual Risk    0-99                               0.4%  100-399                          1.3%  >400                              2.4%    These three \"breakpoints\" correspond to lower, intermediate and high  risk states for future coronary events.  Such information should be  used, along with appropriate clinical judgment, to make decisions  regarding the intensity of risk factor management strategies to treat  blood lipids and to modify other non-lipid coronary risk factors.    Reference: Taberg P et al. Circulation.  2007; 115:402-426    Cardiac MRI: No results found for this or any previous visit from the past 1800 days.      Current Outpatient Medications   Medication Instructions    albuterol 90 mcg/actuation inhaler INHALE 2 PUFFS BY MOUTH AS DIRECTED INTO THE LUNGS EVERY 4 HOURS AS NEEDED FOR WHEEZING/SHORTNESS OF BREATH    amlodipine-olmesartan (Shiva) 10-20 mg tablet 1 tablet, oral, Daily    ascorbic acid (Vitamin C) 1,000 mg tablet Vitamin C 1000 MG Oral Tablet   Refills: 0       Active    " Bifidobacterium infantis (Align) 4 mg capsule oral    blood-glucose sensor (FreeStyle Clayton 3 Plus Sensor) device Apply 1 sensor to the back of the upper arm. Change sensor every 15 days.    Breo Ellipta 100-25 mcg/dose inhaler 1 puff, inhalation, Daily    brompheniramine-pseudoeph-DM 2-30-10 mg/5 mL syrup TAKE 10 ML BY MOUTH FOUR TIMES DAILY AS NEEDED.    cetirizine (ZyrTEC) 10 mg tablet 1 tablet, oral, Daily    cholecalciferol (VITAMIN D3) 2,000 Units, oral, Daily    dicyclomine (BENTYL) 20 mg, oral, 2 times daily PRN    empagliflozin-metformin (Synjardy XR) 25-1,000 mg 24 hr tablet TAKE ONE TABLET BY MOUTH ONCE DAILY    famotidine (PEPCID) 20 mg, oral, Daily    montelukast (SINGULAIR) 10 mg, oral, Daily    Mounjaro 12.5 mg, subcutaneous, Once Weekly    multivitamin with minerals iron-free (Centrum Silver) oral    omeprazole (PriLOSEC) 40 mg DR capsule TAKE ONE CAPSULE BY MOUTH EVERY DAY FOR 90 DAYS    ondansetron (Zofran) 4 mg tablet TAKE ONE TABLET BY MOUTH EVERY 8 TO 12 HOURS AS NEEDED    spironolactone (ALDACTONE) 25 mg, oral, Daily    tadalafil 20 mg, oral, As needed    testosterone 20.25 mg/1.25 gram (1.62 %) gel in metered-dose pump APPLY 1 PUMP PRESS TO ONE UPPER ARM AND SHOULDER AND THEN APPLY 1 PUMP PRESS TO THE OPPOSITE UPPER ARM AND SHOULDER ONCE DAILY IN THE [facility]    zinc sulfate (Zincate) 220 (50 Zn) MG capsule oral        Patient Instructions   There are several ways to reduce your risk for heart disease and stroke through lifestyle measures.  These include changes to your diet to reduce salt, increase consumption of fruits and vegetables, choose whole grains such as whole-wheat, choose low fat dairy products and avoid saturated and transfats, reduce sugar intake and avoid snacks and sweets, and choose lean meats over high cholesterol fatty meat.  It is also recommended to increase physical activity such as brisk walking, jogging, swimming, or bicycling for at least 150 minutes/week.  This can be  divided up over 30-minute periods 5 times per week.  It is also recommended that you try to get 8 hours of sleep per night.     If I placed a referral today for a specialist/procedure, please call the general scheduling line at [phone]. You may also schedule appointments on the [facility]. For radiology scheduling (Cardiac calcium score, CTA with HeartFlow, Cardiac MRI, NM cardiac stress test, PET viability study) the phone number is [phone].     - Continue current medications with the following changes: ***       Orders:  No orders of the defined types were placed in this encounter.      Followup Appts:  Future Appointments   Date Time Provider Department [place]   4/18/2025 10:40 AM Jacinto Willis MD [id number] [facility]   5/9/2025 10:00 AM Yaron Olivier, PharmD [id number] [facility]   5/23/2025  9:00 AM Claudy Cha MD [id number] [facility]   5/23/2025 10:00 AM AHU STRESS 1 [id number] [facility]   5/23/2025 11:00 AM AHU X-RAY 2 AHUDR [facility]   5/23/2025 11:15 AM Saumya Cormier RD, LD [id number] [facility]   5/23/2025 12:30 PM Bailey Pittman, PharmD Lamberto [facility]       Time Spent: I spent ____Pminutes reviewing medical testing, obtaining medical history and counselling and educating on diagnosis and documenting clinical encounter.   ____________________________________________________________  Jacinto Willis MD  Chief Cardiovascular Medicine  Founding Director JASSI Sousa [facility] Professor of Medicine  [facility]        [1]   Allergies  Allergen Reactions    Dog Dander Shortness of breath    Apple Swelling    Lisinopril Cough     Cough    Tramadol Hives    Trazodone Hcl Unknown    Tree Nuts Swelling

## 2025-04-28 DIAGNOSIS — E11.9 TYPE 2 DIABETES MELLITUS WITHOUT COMPLICATION, WITHOUT LONG-TERM CURRENT USE OF INSULIN: ICD-10-CM

## 2025-04-29 DIAGNOSIS — I10 HYPERTENSION, UNSPECIFIED TYPE: ICD-10-CM

## 2025-04-29 DIAGNOSIS — E11.9 TYPE 2 DIABETES MELLITUS WITHOUT COMPLICATION, WITHOUT LONG-TERM CURRENT USE OF INSULIN: ICD-10-CM

## 2025-04-29 RX ORDER — TIRZEPATIDE 12.5 MG/.5ML
12.5 INJECTION, SOLUTION SUBCUTANEOUS
Qty: 2 ML | Refills: 11 | Status: CANCELLED | OUTPATIENT
Start: 2025-04-29

## 2025-04-30 DIAGNOSIS — E11.9 TYPE 2 DIABETES MELLITUS WITHOUT COMPLICATION, WITHOUT LONG-TERM CURRENT USE OF INSULIN: ICD-10-CM

## 2025-04-30 RX ORDER — SPIRONOLACTONE 25 MG/1
25 TABLET ORAL DAILY
Qty: 90 TABLET | Refills: 3 | Status: SHIPPED | OUTPATIENT
Start: 2025-04-30 | End: 2025-05-07 | Stop reason: SDUPTHER

## 2025-05-09 ENCOUNTER — APPOINTMENT (OUTPATIENT)
Dept: PHARMACY | Facility: HOSPITAL | Age: 60
End: 2025-05-09
Payer: COMMERCIAL

## 2025-05-09 DIAGNOSIS — E11.9 TYPE 2 DIABETES MELLITUS WITHOUT COMPLICATION, WITHOUT LONG-TERM CURRENT USE OF INSULIN: ICD-10-CM

## 2025-05-09 DIAGNOSIS — I10 HYPERTENSION, UNSPECIFIED TYPE: ICD-10-CM

## 2025-05-09 PROCEDURE — RXMED WILLOW AMBULATORY MEDICATION CHARGE

## 2025-05-09 ASSESSMENT — ENCOUNTER SYMPTOMS
BLURRED VISION: 0
FATIGUE: 0
POLYDIPSIA: 0
DIABETIC ASSOCIATED SYMPTOMS: 0
WEAKNESS: 0
POLYPHAGIA: 0
WEIGHT LOSS: 0
VISUAL CHANGE: 0

## 2025-05-09 NOTE — PROGRESS NOTES
Patient is sent at the request of Jacinto Willis MD for medication management.  My final recommendations will be communicated back to the requesting provider by way of shared medical record.    Subjective     Jimmie Dodd is a 59 y.o. year old male patient with controlled type two diabetes mellitus (A1c 5.9%), fatty liver, class II obesity (39.59  kg/m2), hypertension, SAMPSON, and hyperlipidemia. Patient follows through the [facility] clinic. He is prescribed Mounjaro 12.5 mg once weekly and Synjardy Xr 25/1000 mg once daily in the setting of type two diabetes mellitus. He is prescribed spironolactone 25 mg daily and amlodipine/olmesartan 10/20 mg once daily in the setting of hypertension. He utilizes Freestyle Clayton 3 for continuous glucose monitoring.     Diabetes  He presents for his follow-up diabetic visit. He has type 2 diabetes mellitus. His disease course has been improving. There are no hypoglycemic associated symptoms. There are no diabetic associated symptoms. Pertinent negatives for diabetes include no blurred vision, no chest pain, no fatigue, no foot paresthesias, no foot ulcerations, no polydipsia, no polyphagia, no polyuria, no visual change, no weakness and no weight loss. There are no hypoglycemic complications. Symptoms are stable. Risk factors for coronary artery disease include diabetes mellitus, hypertension, male sex and obesity. He is compliant with treatment all of the time. His weight is stable. He never participates in exercise. There is no change in his home blood glucose trend. His overall blood glucose range is 110-130 mg/dl. An ACE inhibitor/angiotensin II receptor blocker is being taken. He sees a podiatrist.Eye exam is current.     Allergies   Allergen Reactions    Dog Dander Shortness of breath    Apple Swelling    Lisinopril Cough     Cough    Tramadol Hives    Trazodone Hcl Unknown    Tree Nuts Swelling     Diabetes Pharmacotherapy:    Synjardy XR 25/1000 mg: Take one tablet  by mouth once daily   Mounjaro 12.5 mg: Inject 12.5 mg under the skin once weekly on Fridays     Historical Diabetes Pharmacotherapy:   Rybelsus 14 mg: Take one tablet by mouth once daily   Metformin 500 mg BID    Hypertension Pharmacotherapy:  Amlodipine/Olmesartan 10/20 mg: Take one tablet by mouth once daily  Spironolactone 25 mg: Take one tablet by mouth once daily    Current BG monitoring regimen:   Patient is using: continuous glucose monitor - Clayton 3   Sensors remaining: 3  Current sensor wear: Yes  Days remaining; ~7 days     Glucometer: Unable to locate current glucometer and testing suppleis           Any episodes of hypoglycemia? Low glucose readings noted overnight. Denies symptoms of hypoglycemia.   Did patient treat episode of hypoglycemia appropriately? Yes    Home Blood Pressure Monitoring:   Upper arm blood pressure monitor  Monitoring frequency: PRN    No blood pressure readings available for assessment during encounter. He will monitor if experiencing what he suspects to be hypertensive symptoms (I.e. headache)    Primary Prevention:   Statin? No  ACE-I/ARB? Yes   Aspirin? No    Pertinent PMH Review:  PMH of Pancreatitis: No  PMH of Retinopathy: No  PMH of Urinary Tract Infections: No  PMH of MTC/MEN Type II: No    Weight Management:  Office Weight - 294 lb (5/10/2024)  Week of 6/9/2024: 296.1 lb (Home weight)  2/7/2025: 293 lb (Home weight)  5/9/2025: 293 lb from memory, unsure of last recorded weight and suspects his weight may be up  Goal: 275 lb    Adverse Effects:   No reported adverse effects with increase to Mounjaro 12.5 mg   No belching, flatulence, diarrhea, vomiting     Objective     BP Readings from Last 5 Encounters:   05/10/24 131/81   03/05/24 122/70   11/10/23 158/89   07/14/23 151/83   05/25/23 146/86     Wt Readings from Last 5 Encounters:   05/10/24 134 kg (294 lb 6.4 oz)   03/05/24 134 kg (296 lb)   03/05/24 134 kg (296 lb)   11/10/23 146 kg (321 lb)   07/14/23 (!) 157 kg (345  lb 4 oz)     BMI Readings from Last 5 Encounters:   05/10/24 37.80 kg/m²   03/05/24 39.59 kg/m²   03/05/24 39.59 kg/m²   11/10/23 41.21 kg/m²   07/14/23 45.55 kg/m²     Labs:   Lab Results   Component Value Date    BILITOT 0.6 04/15/2025    CALCIUM 9.4 04/15/2025    CO2 26 04/15/2025     04/15/2025    CREATININE 0.87 04/15/2025    GLUCOSE 124 (H) 04/15/2025    ALKPHOS 54 04/15/2025    K 4.0 04/15/2025    PROT 6.6 04/15/2025     04/15/2025    AST 23 04/15/2025    ALT 25 04/15/2025    BUN 24 04/15/2025    ANIONGAP 9 04/15/2025    MG 2.20 03/05/2024    ALBUMIN 4.6 04/15/2025    GFRMALE >90 07/14/2023     Lab Results   Component Value Date    TRIG 108 04/15/2025    CHOL 153 04/15/2025    LDLCALC 85 04/15/2025    HDL 48 04/15/2025     Lab Results   Component Value Date    HGBA1C 5.9 (H) 04/15/2025    HGBA1C 5.4 03/05/2024    HGBA1C 6.6 (H) 11/10/2023     Lab Results   Component Value Date    MICROALBCREA 10 04/15/2025    MICROALBCREA 8.2 05/10/2024    MICROALBCREA  11/10/2023      Comment:      One or more analytes used in this calculation is outside of the analytical measurement range. Calculation cannot be performed.    MICROALBCREA SEE COMMENT 07/14/2023     Health Maintenance:   LDL - 111 mg/dL --> 87 mg/dL on 3/5/2024 --> 4/15/2025 85 mg/dL   Urine Albumin: WNL - 11/10/2023 --> 5/10/2024 WNL --> 4/15/2025 10 mcg/mg     Assessment/Plan   Problem List Items Addressed This Visit       DM type 2 (diabetes mellitus, type 2) (Multi)    Controlled A1c of 5.9% on 4/15/2025. Goal <7.0%. Stable from previous hemoglobin A1c on 5.4% on 3/5/2025. No evidence of proteinuria. LDL-c at 85 mg/dL currently not prescribed statin therapy. Amlodipine/olmesartan 10/20 mg prescribed in the setting of hypertension in addition to spironolactone 25 mg once daily.     CGM data reviewed and discussed with patient for the 90 day reporting period. 99% time in target. 1% high. Average glucose of 125 mg/dL. GMI 6.3%.     Advised to  continue therapy with Mounjaro 12.5 mg once weekly and Synjardy Xr 25/1000 mg once daily. Continue Clayton 3 continuous glucose monitoring. Discussed targeting 150 minutes of exercise weekly. Weight has not been recorded at home, suspects that his weight has increased.. Patient target goal 275 lb. Advised to schedule follow up with [facility] clinic and complete repeat laboratory studies. According to the ADA and ACC/AHA guidelines, moderate-intensity statin and lifestyle modifications are recommended for all diabetic patients aged 40-75 without contraindication to statin therapy to achieve an LDL goal of less than 70 mg/dL. Complete routine follow-up with podiatry and ophthalmology.          High blood pressure     Follow-up: PRN   [facility] follow up: 5/16/2025    Yaron Olivier, PharmD    Continue all meds under the continuation of care with the referring provider and clinical pharmacy team.

## 2025-05-12 NOTE — ASSESSMENT & PLAN NOTE
Controlled A1c of 5.9% on 4/15/2025. Goal <7.0%. Stable from previous hemoglobin A1c on 5.4% on 3/5/2025. No evidence of proteinuria. LDL-c at 85 mg/dL currently not prescribed statin therapy. Amlodipine/olmesartan 10/20 mg prescribed in the setting of hypertension in addition to spironolactone 25 mg once daily.     CGM data reviewed and discussed with patient for the 90 day reporting period. 99% time in target. 1% high. Average glucose of 125 mg/dL. GMI 6.3%.     Advised to continue therapy with Mounjaro 12.5 mg once weekly and Synjardy Xr 25/1000 mg once daily. Continue Clayton 3 continuous glucose monitoring. Discussed targeting 150 minutes of exercise weekly. Weight has not been recorded at home, suspects that his weight has increased.. Patient target goal 275 lb. Advised to schedule follow up with [facility] clinic and complete repeat laboratory studies. According to the ADA and ACC/AHA guidelines, moderate-intensity statin and lifestyle modifications are recommended for all diabetic patients aged 40-75 without contraindication to statin therapy to achieve an LDL goal of less than 70 mg/dL. Complete routine follow-up with podiatry and ophthalmology.

## 2025-05-13 ENCOUNTER — PHARMACY VISIT (OUTPATIENT)
Dept: PHARMACY | Facility: CLINIC | Age: 60
End: 2025-05-13
Payer: COMMERCIAL

## 2025-05-13 RX ORDER — SPIRONOLACTONE 25 MG/1
25 TABLET ORAL DAILY
Qty: 90 TABLET | Refills: 3 | OUTPATIENT
Start: 2025-05-13

## 2025-05-16 ENCOUNTER — TELEMEDICINE (OUTPATIENT)
Dept: CARDIOLOGY | Facility: HOSPITAL | Age: 60
End: 2025-05-16
Payer: COMMERCIAL

## 2025-05-16 DIAGNOSIS — I50.30 HEART FAILURE WITH PRESERVED EJECTION FRACTION, UNSPECIFIED HF CHRONICITY: ICD-10-CM

## 2025-05-16 DIAGNOSIS — E78.2 MIXED HYPERLIPIDEMIA: ICD-10-CM

## 2025-05-16 DIAGNOSIS — I10 PRIMARY HYPERTENSION: ICD-10-CM

## 2025-05-16 DIAGNOSIS — E11.9 TYPE 2 DIABETES MELLITUS WITHOUT COMPLICATION, WITHOUT LONG-TERM CURRENT USE OF INSULIN: Primary | ICD-10-CM

## 2025-05-16 DIAGNOSIS — I21.9 MYOCARDIAL INFARCTION, UNSPECIFIED MI TYPE, UNSPECIFIED ARTERY (MULTI): ICD-10-CM

## 2025-05-16 DIAGNOSIS — K76.0 METABOLIC DYSFUNCTION-ASSOCIATED STEATOTIC LIVER DISEASE (MASLD): ICD-10-CM

## 2025-05-16 PROCEDURE — 99215 OFFICE O/P EST HI 40 MIN: CPT | Performed by: INTERNAL MEDICINE

## 2025-05-16 NOTE — PROGRESS NOTES
"Dear  No Assigned PCP Generic Provider, MD    It was a pleasure to meet with Jimmie Dodd, a 59 y.o. male, who presents for a 2-year follow-up evaluation in the [facility] program.     Reason for Visit: No chief complaint on file.         PROBLEM LIST  1. T2DM/IR. On Met+SGLT2i.. Last HbA1c in March 2025 was 5.9.  His most recent hemoglobin A1c after starting the [facility] program is 5.4.  He is currently on tirzepatide 12.5 mg in conjunction with Synjardy XR which is a combination of empagliflozin and metformin.  His weight   2.  Hyperlipidemia. LDL 55; VLDL 55; NHDL 103.      3. Hypertension.  On combination of amlodipine/olmesartan 10/20 with spironolactone 25 mg. Echo demonstrates normal LV function. No evidence of diastolic dysfunction     4. HFPEF. ACC/AHA Stage B. NYYA Class 2.     5. NAFLD. Fibroscan positive for Cirrhosis. FIB-4 was 1.113. Biopsy was negative for fibrosis. Fatty liver.     5. Hyperlipidemia.  Most recent HDL of 38 and LDL of 87 with triglycerides of 220 obtained in 3/2024.  A prior NMR lipoprotein profile performed in 2023 revealed: total LDL particles 1000 341 at that time with small LDL particles of 1036 which are quite elevated.     6. 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 IWMI pattern on EKG     7. Zero CAC 2022.  He had a stress test performed in 2024 that was negative at reasonable workloads.    Assessment & Plan         Jimmie Dodd is a 59 y.o. male who presents for a follow-up evaluation in the [facility] beds at the [facility] program.     History of Present Illness       Metabolic Parameters  Lab Results   Component Value Date    LDLCALC 85 04/15/2025    LDLCALC 87 03/05/2024    LDLCALC 111 (H) 11/10/2023     Lab Results   Component Value Date    HGBA1C 5.9 (H) 04/15/2025    HGBA1C 5.4 03/05/2024    HGBA1C 6.6 (H) 11/10/2023     No results found for: \"WEIGHT\"      Objective   Social Hx:   He reports that he has never smoked. He has never used smokeless tobacco. No history on file for " "alcohol use and drug use.  Family Hx:   His family history includes Aortic aneurysm in an other family member; Colon cancer in some other family members; Diabetes type II in his mother; Hodgkin's lymphoma in his mother; Hypertension in his father; cardiac disorder in an other family member; lipid disorder in his father.   Medical History:   He@[facility]HP   Allergies:   RX Allergies[1]     Exam:            5/10/2024    11:21 AM 4/9/2024     3:41 PM 3/5/2024    11:07 AM 3/5/2024     8:53 AM 11/10/2023    10:36 AM 7/14/2023     9:50 AM   Vitals   Systolic 131   122 158 151   Diastolic 81   70 89 83   BP Location Left arm    Left arm    Heart Rate 95   80 85 79   Temp  36.7 °C (98 °F)       Resp      18   Height 1.88 m (6' 2\")  1.842 m (6' 0.5\") 1.842 m (6' 0.5\") 1.88 m (6' 2\") 1.854 m (6' 1\")   Weight (lb) 294.4  296 296 321 345.25   BMI 37.8 kg/m2  39.59 kg/m2 39.59 kg/m2 41.21 kg/m2 45.55 kg/m2   BSA (m2) 2.65 m2  2.62 m2 2.62 m2 2.76 m2 2.84 m2   Visit Report Report Report Report Report Report      Wt Readings from Last 5 Encounters:   05/10/24 134 kg (294 lb 6.4 oz)   03/05/24 134 kg (296 lb)   03/05/24 134 kg (296 lb)   11/10/23 146 kg (321 lb)   07/14/23 (!) 157 kg (345 lb 4 oz)     BMI Readings from Last 5 Encounters:   05/10/24 37.80 kg/m²   03/05/24 39.59 kg/m²   03/05/24 39.59 kg/m²   11/10/23 41.21 kg/m²   07/14/23 45.55 kg/m²      BP Readings from Last 5 Encounters:   05/10/24 131/81   03/05/24 122/70   11/10/23 158/89   07/14/23 151/83   05/25/23 146/86       Physical Exam      Labs:   Results       Recent Labs     04/15/25  1014 03/05/24  0922 11/10/23  1021 07/14/23  1130 03/23/23  0948    139 138 139 137   K 4.0 4.1 3.7 4.0 3.9    102 97* 101 101   CO2 26 24 30 27 26   ANIONGAP 9 17 15 15 14   BUN 24 23 22 18 17   CREATININE 0.87 0.92 0.95 0.81 0.91   EGFR 101 >90 >90  --   --    MG  --  2.20  --   --  2.00     Recent Labs     04/15/25  1014 03/05/24  0922 11/10/23  1021   ALBUMIN 4.6 4.5 4.6 " "  ALKPHOS 54 56 48   ALT 25 20 27   AST 23 17 18   BILITOT 0.6 0.3 0.7   CBC:@CVLABRCNT(WBC:5,HGB:5,HCT:5,PLT:5,MCV:5) CARDIAC: No results for input(s): \"LDH\", \"CKMB\", \"TROPHS\", \"BNP\" in the last 94897 hours.    No lab exists for component: \"CK\", \"CKMBP\"  Recent Labs     04/15/25  1014 05/10/24  1103 03/05/24  0922 11/10/23  1021 07/14/23  1130 03/23/23  0948 07/21/22  0821 01/19/22  1042   HGBA1C 5.9*  --  5.4 6.6*  --  7.6*   < > 8.2*   CHOL 153  --  170 193 164 144  --  137   LDLF  --   --   --   --  72 59  --  44   LDLCALC 85  --  87 111*  --   --   --   --    HDL 48  --  39.3 40.7 35.8* 30.0*  --  29.8*   TRIG 108  --  220* 205* 283* 273*  --  315*   LIPOA  --   --  <6  --  <6 <6  --  <8.4   LDLP  --  1149*  --   --  1,341*  --   --   --    SMLDLP  --  361  --   --  1,036*  --   --   --     < > = values in this interval not displayed.        Notable Studies:   EKG:   Recent Labs     07/14/23  0944   VENTRATE 78   ATRRATE 78   QTCFRED 406   QRSDUR 106   QTCCALCB 424   No results found for this or any previous visit (from the past 4464 hours).  Echocardiogram: No results for input(s): \"EF\", \"LVIDD\", \"IVS\", \"RV\", \"RVFRWALLPKSP\", \"TAPSE\" in the last 94698 hours.  Echocardiogram     Narrative  [facility]  [address], [address], [place] [address]  Tel [phone]    TRANSTHORACIC ECHOCARDIOGRAM REPORT      Patient Name:     CJ CHRISTOPHERBALTAZARHODA Stanley Physician:   [id number] Jose Alfredo Xie MD  Study Date:       5/30/2023         Referring Physician: YOVANNY RUIZ  MRN/PID:          [id number]          PCP:  Accession/Order#: [id number]      Department Location: [facility]  YOB: 1965         Fellow:  Gender:           M                 Nurse:  Admit Date:                         Sonographer:         Svetlana Cisneros [facility]  Admission Status: Outpatient        Additional Staff:  Height:           185.42 cm         CC Report to:  Weight:           158.31 kg         Study Type:          " Echocardiogram  BSA:              2.73 m2  Blood Pressure: 146 /74 mmHg    Diagnosis/ICD: R07.9-Chest pain, unspecified  Indication:    Chest Pain  Procedure/CPT: Echo Complete w Full Doppler-[id number]  Study Detail: The following Echo studies were performed: 2D, M-Mode, Doppler and  color flow.      PHYSICIAN INTERPRETATION:  Left Ventricle: The left ventricular systolic function is normal, with an estimated ejection fraction of 60-65%. There are no regional wall motion abnormalities. The left ventricular cavity size is normal. Spectral Doppler shows a normal pattern of left ventricular diastolic filling.  Left Atrium: The left atrium is normal in size.  Right Ventricle: The right ventricle is normal in size. There is normal right ventricular global systolic function.  Right Atrium: The right atrium is normal in size.  Aortic Valve: The aortic valve is trileaflet. There is no evidence of aortic valve regurgitation.  Mitral Valve: The mitral valve is normal in structure. There is no evidence of mitral valve regurgitation.  Tricuspid Valve: The tricuspid valve is structurally normal. No evidence of tricuspid regurgitation. The right ventricular systolic pressure is unable to be estimated.  Pulmonic Valve: The pulmonic valve is not well visualized. There is physiologic pulmonic valve regurgitation.  Pericardium: There is no pericardial effusion noted.  Aorta: The aortic root is normal.  Systemic Veins: The inferior vena cava was not well visualized.  Additional Comments: Normal Echocardiogram.  In comparison to the previous echocardiogram(s): There are no prior studies on this patient for comparison purposes.      CONCLUSIONS:  1. Normal Echocardiogram.  2. Left ventricular systolic function is normal with a 60-65% estimated ejection fraction.    QUANTITATIVE DATA SUMMARY:  2D MEASUREMENTS:  Normal Ranges:  Ao Root d:     3.20 cm   (2.0-3.7cm)  IVSd:          1.10 cm   (0.6-1.1cm)  LVPWd:         0.90 cm    (0.6-1.1cm)  LVIDd:         5.50 cm   (3.9-5.9cm)  LVIDs:         3.90 cm  LV Mass Index: 78.2 g/m2  LV % FS        29.1 %    LA VOLUME:  Normal Ranges:  LA Vol A4C:        62.6 ml    (22+/-6mL/m2)  LA Vol A2C:        57.3 ml  LA Vol BP:         63.3 ml  LA Vol Index A4C:  23.0ml/m2  LA Vol Index A2C:  21.0 ml/m2  LA Vol Index BP:   23.2 ml/m2  LA Area A4C:       21.9 cm2  LA Area A2C:       19.8 cm2  LA Major Axis A4C: 6.5 cm  LA Major Axis A2C: 5.8 cm    RA VOLUME BY A/L METHOD:  Normal Ranges:  RA Area A4C: 16.7 cm2    AORTA MEASUREMENTS:  Normal Ranges:  Asc Ao, d: 3.20 cm (2.1-3.4cm)    LV SYSTOLIC FUNCTION BY 2D PLANIMETRY (MOD):  Normal Ranges:  EF-A4C View: 69.4 % (>=55%)  EF-A2C View: 54.4 %  EF-Biplane:  61.3 %    LV DIASTOLIC FUNCTION:  Normal Ranges:  MV Peak E:    1.13 m/s   (0.7-1.2 m/s)  MV Peak A:    0.98 m/s   (0.42-0.7 m/s)  E/A Ratio:    1.16       (1.0-2.2)  MV e'         0.10 m/s   (>8.0)  MV lateral e' 0.11 m/s  MV medial e'  0.10 m/s  MV A Dur:     79.00 msec  E/e' Ratio:   10.76      (<8.0)    MITRAL VALVE:  Normal Ranges:  MV DT: 234 msec (150-240msec)    AORTIC VALVE:  Normal Ranges:  LVOT Diameter: 2.00 cm (1.8-2.4cm)    RIGHT VENTRICLE:  RV 1   4.1 cm  RV 2   3.3 cm  RV 3   9.5 cm  TAPSE: 32.6 mm      [id number] Jose Alfredo Xie MD  Electronically signed on 5/30/2023 at 11:25:22 AM      Coronary Angiography: No results found for this or any previous visit from the past 1800 days.    Cardiac Scoring:   CT HEART CALCIUM SCORING WO IV CONTRAST 01/19/2022    Narrative  MRN: [id number]  Patient Name: CJ MEDINA    STUDY:  CT CARDIAC SCORING;  1/19/2022 11:33 am    INDICATION:  EXECUTIVEECHO1, VASC1,VASC4,, DCH02 STAT.    COMPARISON:  None.    ACCESSION NUMBER(S):  [id number]    ORDERING CLINICIAN:  YOVANNY RUIZ    TECHNIQUE:  Using prospective ECG gating, CT scan of the coronary arteries was  performed without intravenous contrast. Coronary calcium scoring  was  performed according to the  "method of Agatston.    FINDINGS:  The score and distribution of calcium in the coronary arteries is as  follows:    LM            0  LAD           0  LCx           0  RCA           0    Total          0      The heart size is normal and there is no pericardial effusion. The  chest vasculature is unremarkable.There is no significant mediastinal  or hilar adenopathy.    The visualized lungs are clear. The visualized upper abdominal  contents are within normal limits.  The bony structures are intact.    Impression  Coronary artery calcium score of  0.  Please note that up to 5% of patients with a negative exam still have  significant noncalcified plaque, and therefore have a falsely \"  negative\" CT coronary calcium score exam.    Coronary artery calcium scoring may be helpful in predicting the risk  for future coronary heart disease events.  According to the American  College of Cardiology Foundation Clinical Expert Consensus Task  Force, such testing provides important prognostic information in  patients with more than one coronary heart disease risk factor. The  coronary artery calcium score correlates with the annual risk of a  non-fatal myocardial infarction or coronary heart disease death.    Coronary artery score            Annual Risk    0-99                               0.4%  100-399                          1.3%  >400                              2.4%    These three \"breakpoints\" correspond to lower, intermediate and high  risk states for future coronary events.  Such information should be  used, along with appropriate clinical judgment, to make decisions  regarding the intensity of risk factor management strategies to treat  blood lipids and to modify other non-lipid coronary risk factors.    Reference: [place] P et al. Circulation.  2007; 115:402-426    Cardiac MRI: No results found for this or any previous visit from the past 1800 days.      Current Outpatient Medications   Medication Instructions    " albuterol 90 mcg/actuation inhaler INHALE 2 PUFFS BY MOUTH AS DIRECTED INTO THE LUNGS EVERY 4 HOURS AS NEEDED FOR WHEEZING/SHORTNESS OF BREATH    amlodipine-olmesartan (Shiva) 10-20 mg tablet 1 tablet, oral, Daily    ascorbic acid (Vitamin C) 1,000 mg tablet Vitamin C 1000 MG Oral Tablet   Refills: 0       Active    Bifidobacterium infantis (Align) 4 mg capsule oral    blood-glucose sensor (FreeStyle Clayton 3 Plus Sensor) device Apply 1 sensor to the back of the upper arm. Change sensor every 15 days.    Breo Ellipta 100-25 mcg/dose inhaler 1 puff, inhalation, Daily    brompheniramine-pseudoeph-DM 2-30-10 mg/5 mL syrup TAKE 10 ML BY MOUTH FOUR TIMES DAILY AS NEEDED.    cetirizine (ZyrTEC) 10 mg tablet 1 tablet, oral, Daily    cholecalciferol (VITAMIN D3) 2,000 Units, oral, Daily    dicyclomine (BENTYL) 20 mg, oral, 2 times daily PRN    empagliflozin-metformin (Synjardy XR) 25-1,000 mg 24 hr tablet TAKE ONE TABLET BY MOUTH ONCE DAILY    famotidine (PEPCID) 20 mg, oral, Daily    montelukast (SINGULAIR) 10 mg, oral, Daily    Mounjaro 12.5 mg, subcutaneous, Once Weekly    multivitamin with minerals iron-free (Centrum Silver) oral    omeprazole (PriLOSEC) 40 mg DR capsule TAKE ONE CAPSULE BY MOUTH EVERY DAY FOR 90 DAYS    ondansetron (Zofran) 4 mg tablet TAKE ONE TABLET BY MOUTH EVERY 8 TO 12 HOURS AS NEEDED    tadalafil 20 mg, oral, As needed    testosterone 20.25 mg/1.25 gram (1.62 %) gel in metered-dose pump APPLY 1 PUMP PRESS TO ONE UPPER ARM AND SHOULDER AND THEN APPLY 1 PUMP PRESS TO THE OPPOSITE UPPER ARM AND SHOULDER ONCE DAILY IN THE MORNIN    zinc sulfate (Zincate) 220 (50 Zn) MG capsule oral        Patient Instructions   There are several ways to reduce your risk for heart disease and stroke through lifestyle measures.  These include changes to your diet to reduce salt, increase consumption of fruits and vegetables, choose whole grains such as whole-wheat, choose low fat dairy products and avoid saturated and  transfats, reduce sugar intake and avoid snacks and sweets, and choose lean meats over high cholesterol fatty meat.  It is also recommended to increase physical activity such as brisk walking, jogging, swimming, or bicycling for at least 150 minutes/week.  This can be divided up over 30-minute periods 5 times per week.  It is also recommended that you try to get 8 hours of sleep per night.     If I placed a referral today for a specialist/procedure, please call the general scheduling line at [phone]. You may also schedule appointments on the [employer] rj. For radiology scheduling (Cardiac calcium score, CTA with HeartFlow, Cardiac MRI, NM cardiac stress test, PET viability study) the phone number is [phone].          Orders:  No orders of the defined types were placed in this encounter.      Followup Appts:  Future Appointments   Date Time Provider Department Center   5/23/2025  9:00 AM Claudy Cha MD [id number] [facility]   5/23/2025 10:00 AM AHU STRESS 1 [id number] [facility]   5/23/2025 11:00 AM U X-RAY 2 [facility]HUDR [facility] Rad   5/23/2025 11:15 AM Saumya Cormier RD, LD [id number] [facility]   5/23/2025 12:30 PM Bailey Pittman, PharmD [facility]       Time Spent: I spent __30__Pminutes reviewing medical testing, obtaining medical history and counselling and educating on diagnosis and documenting clinical encounter.   ____________________________________________________________  Jacinto Willis MD  Chief Cardiovascular Medicine  Founding Director JASSI Sousa [facility] Professor of Medicine  [facility]          [1]   Allergies  Allergen Reactions    Dog Dander Shortness of breath    Apple Swelling    Lisinopril Cough     Cough    Tramadol Hives    Trazodone Hcl Unknown    Tree Nuts Swelling      MOUTH FOUR TIMES DAILY AS NEEDED.    cetirizine (ZyrTEC) 10 mg tablet 1 tablet, oral, Daily    cholecalciferol (VITAMIN D3) 2,000 Units, oral, Daily    dicyclomine (BENTYL) 20 mg, oral, 2 times daily PRN    empagliflozin-metformin (Synjardy XR) 25-1,000 mg 24 hr tablet TAKE ONE TABLET BY MOUTH ONCE DAILY    famotidine (PEPCID) 20 mg, oral, Daily    montelukast (SINGULAIR) 10 mg, oral, Daily    Mounjaro 12.5 mg, subcutaneous, Once Weekly    multivitamin with minerals iron-free (Centrum Silver) oral    omeprazole (PriLOSEC) 40 mg DR capsule TAKE ONE CAPSULE BY MOUTH EVERY DAY FOR 90 DAYS    ondansetron (Zofran) 4 mg tablet TAKE ONE TABLET BY MOUTH EVERY 8 TO 12 HOURS AS NEEDED    tadalafil 20 mg, oral, As needed    testosterone 20.25 mg/1.25 gram (1.62 %) gel in metered-dose pump APPLY 1 PUMP PRESS TO ONE UPPER ARM AND SHOULDER AND THEN APPLY 1 PUMP PRESS TO THE OPPOSITE UPPER ARM AND SHOULDER ONCE DAILY IN THE MORNIN    zinc sulfate (Zincate) 220 (50 Zn) MG capsule oral        Patient Instructions   There are several ways to reduce your risk for heart disease and stroke through lifestyle measures.  These include changes to your diet to reduce salt, increase consumption of fruits and vegetables, choose whole grains such as whole-wheat, choose low fat dairy products and avoid saturated and transfats, reduce sugar intake and avoid snacks and sweets, and choose lean meats over high cholesterol fatty meat.  It is also recommended to increase physical activity such as brisk walking, jogging, swimming, or bicycling for at least 150 minutes/week.  This can be divided up over 30-minute periods 5 times per week.  It is also recommended that you try to get 8 hours of sleep per night.     If I placed a referral today for a specialist/procedure, please call the general scheduling line at [phone]. You may also schedule appointments on the [employer] rj. For radiology scheduling (Cardiac calcium score, CTA with HeartFlow,  Cardiac MRI, NM cardiac stress test, PET viability study) the phone number is [phone].          Orders:  No orders of the defined types were placed in this encounter.      Followup Appts:  Future Appointments   Date Time Provider Department [place]   5/23/2025  9:00 AM Claudy Cha MD [id number] [facility]   5/23/2025 10:00 AM U STRESS 1 AHUNIC1 [facility]   5/23/2025 11:00 AM [facility] X-RAY 2 AHUDR [facility] Rad   5/23/2025 11:15 AM Saumya Cormier RD, LD [id number] [facility]   5/23/2025 12:30 PM Jackson Aguirre [facility]       Time Spent: I spent __30__Pminutes reviewing medical testing, obtaining medical history and counselling and educating on diagnosis and documenting clinical encounter.   ____________________________________________________________  Jacinto Willis MD  Chief Cardiovascular Medicine  Founding Director JASSI Sousa [facility] Professor of Medicine  [facility]          [1]   Allergies  Allergen Reactions    Dog Dander Shortness of breath    Apple Swelling    Lisinopril Cough     Cough    Tramadol Hives    Trazodone Hcl Unknown    Tree Nuts Swelling

## 2025-05-19 ENCOUNTER — PHARMACY VISIT (OUTPATIENT)
Dept: PHARMACY | Facility: CLINIC | Age: 60
End: 2025-05-19
Payer: COMMERCIAL

## 2025-05-19 DIAGNOSIS — E11.9 TYPE 2 DIABETES MELLITUS WITHOUT COMPLICATION, WITHOUT LONG-TERM CURRENT USE OF INSULIN: ICD-10-CM

## 2025-05-19 DIAGNOSIS — I10 HYPERTENSION, UNSPECIFIED TYPE: ICD-10-CM

## 2025-05-19 PROBLEM — K76.0 METABOLIC DYSFUNCTION-ASSOCIATED STEATOTIC LIVER DISEASE (MASLD): Status: ACTIVE | Noted: 2023-09-26

## 2025-05-19 PROCEDURE — RXMED WILLOW AMBULATORY MEDICATION CHARGE

## 2025-05-19 RX ORDER — TIRZEPATIDE 12.5 MG/.5ML
12.5 INJECTION, SOLUTION SUBCUTANEOUS
Qty: 2 ML | Refills: 11 | Status: SHIPPED | OUTPATIENT
Start: 2025-05-25 | End: 2025-05-20 | Stop reason: DRUGHIGH

## 2025-05-19 RX ORDER — EZETIMIBE 10 MG/1
10 TABLET ORAL DAILY
Qty: 90 TABLET | Refills: 3 | Status: SHIPPED | OUTPATIENT
Start: 2025-05-19 | End: 2026-05-19

## 2025-05-20 DIAGNOSIS — E11.9 TYPE 2 DIABETES MELLITUS WITHOUT COMPLICATION, WITHOUT LONG-TERM CURRENT USE OF INSULIN: Primary | ICD-10-CM

## 2025-05-20 PROCEDURE — RXMED WILLOW AMBULATORY MEDICATION CHARGE

## 2025-05-21 ENCOUNTER — PHARMACY VISIT (OUTPATIENT)
Dept: PHARMACY | Facility: CLINIC | Age: 60
End: 2025-05-21
Payer: COMMERCIAL
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
## 2025-05-23 ENCOUNTER — APPOINTMENT (OUTPATIENT)
Dept: PRIMARY CARE | Facility: CLINIC | Age: 60
End: 2025-05-23

## 2025-05-23 ENCOUNTER — HOSPITAL ENCOUNTER (OUTPATIENT)
Dept: CARDIOLOGY | Facility: HOSPITAL | Age: 60
Discharge: HOME | End: 2025-05-23
Payer: COMMERCIAL

## 2025-05-23 ENCOUNTER — NUTRITION (OUTPATIENT)
Dept: PRIMARY CARE | Facility: CLINIC | Age: 60
End: 2025-05-23

## 2025-05-23 ENCOUNTER — APPOINTMENT (OUTPATIENT)
Dept: INTEGRATIVE MEDICINE | Facility: CLINIC | Age: 60
End: 2025-05-23
Payer: COMMERCIAL

## 2025-05-23 ENCOUNTER — HOSPITAL ENCOUNTER (OUTPATIENT)
Dept: RADIOLOGY | Facility: HOSPITAL | Age: 60
Discharge: HOME | End: 2025-05-23
Payer: COMMERCIAL

## 2025-05-23 VITALS
SYSTOLIC BLOOD PRESSURE: 126 MMHG | WEIGHT: 313.94 LBS | OXYGEN SATURATION: 96 % | DIASTOLIC BLOOD PRESSURE: 78 MMHG | HEART RATE: 76 BPM | BODY MASS INDEX: 40.29 KG/M2 | HEIGHT: 74 IN

## 2025-05-23 VITALS — WEIGHT: 313 LBS | HEIGHT: 74 IN | BODY MASS INDEX: 40.17 KG/M2

## 2025-05-23 DIAGNOSIS — Z00.00 HEALTH MAINTENANCE EXAMINATION: ICD-10-CM

## 2025-05-23 DIAGNOSIS — Z00.00 HEALTHCARE MAINTENANCE: Primary | ICD-10-CM

## 2025-05-23 DIAGNOSIS — Z00.00 HEALTH MAINTENANCE EXAMINATION: Primary | ICD-10-CM

## 2025-05-23 LAB
25(OH)D3 SERPL-MCNC: 48 NG/ML (ref 30–100)
ALBUMIN SERPL BCP-MCNC: 4.8 G/DL (ref 3.4–5)
ALP SERPL-CCNC: 55 U/L (ref 33–120)
ALT SERPL W P-5'-P-CCNC: 32 U/L (ref 10–52)
ANION GAP SERPL CALC-SCNC: 17 MMOL/L (ref 10–20)
AST SERPL W P-5'-P-CCNC: 24 U/L (ref 9–39)
BASOPHILS # BLD AUTO: 0.04 X10*3/UL (ref 0–0.1)
BASOPHILS NFR BLD AUTO: 0.5 %
BILIRUB SERPL-MCNC: 0.8 MG/DL (ref 0–1.2)
BUN SERPL-MCNC: 17 MG/DL (ref 6–23)
CALCIUM SERPL-MCNC: 9.7 MG/DL (ref 8.6–10.3)
CHLORIDE SERPL-SCNC: 101 MMOL/L (ref 98–107)
CHOLEST SERPL-MCNC: 183 MG/DL (ref 0–199)
CHOLESTEROL/HDL RATIO: 3.7
CO2 SERPL-SCNC: 24 MMOL/L (ref 21–32)
CREAT SERPL-MCNC: 0.76 MG/DL (ref 0.5–1.3)
CRP SERPL HS-MCNC: 0.3 MG/L
EGFRCR SERPLBLD CKD-EPI 2021: >90 ML/MIN/1.73M*2
EOSINOPHIL # BLD AUTO: 0.25 X10*3/UL (ref 0–0.7)
EOSINOPHIL NFR BLD AUTO: 3.1 %
ERYTHROCYTE [DISTWIDTH] IN BLOOD BY AUTOMATED COUNT: 12.7 % (ref 11.5–14.5)
EST. AVERAGE GLUCOSE BLD GHB EST-MCNC: 111 MG/DL
FERRITIN SERPL-MCNC: 27 NG/ML (ref 20–300)
GLUCOSE SERPL-MCNC: 110 MG/DL (ref 74–99)
HBA1C MFR BLD: 5.5 % (ref ?–5.7)
HCT VFR BLD AUTO: 44.3 % (ref 41–52)
HDLC SERPL-MCNC: 49 MG/DL
HGB BLD-MCNC: 15.4 G/DL (ref 13.5–17.5)
IMM GRANULOCYTES # BLD AUTO: 0.06 X10*3/UL (ref 0–0.7)
IMM GRANULOCYTES NFR BLD AUTO: 0.7 % (ref 0–0.9)
INSULIN P FAST SERPL-ACNC: 16 UIU/ML (ref 3–25)
IRON SATN MFR SERPL: 30 % (ref 25–45)
IRON SERPL-MCNC: 138 UG/DL (ref 35–150)
LDLC SERPL CALC-MCNC: 93 MG/DL
LYMPHOCYTES # BLD AUTO: 1.92 X10*3/UL (ref 1.2–4.8)
LYMPHOCYTES NFR BLD AUTO: 23.6 %
MAGNESIUM SERPL-MCNC: 2.26 MG/DL (ref 1.6–2.4)
MCH RBC QN AUTO: 29.4 PG (ref 26–34)
MCHC RBC AUTO-ENTMCNC: 34.8 G/DL (ref 32–36)
MCV RBC AUTO: 85 FL (ref 80–100)
MONOCYTES # BLD AUTO: 0.61 X10*3/UL (ref 0.1–1)
MONOCYTES NFR BLD AUTO: 7.5 %
NEUTROPHILS # BLD AUTO: 5.24 X10*3/UL (ref 1.2–7.7)
NEUTROPHILS NFR BLD AUTO: 64.6 %
NON HDL CHOLESTEROL: 134 MG/DL (ref 0–149)
NRBC BLD-RTO: 0 /100 WBCS (ref 0–0)
PLATELET # BLD AUTO: 164 X10*3/UL (ref 150–450)
POC APPEARANCE, URINE: CLEAR
POC BILIRUBIN, URINE: NEGATIVE
POC BLOOD, URINE: NEGATIVE
POC COLOR, URINE: YELLOW
POC GLUCOSE, URINE: ABNORMAL MG/DL
POC KETONES, URINE: NEGATIVE MG/DL
POC LEUKOCYTES, URINE: NEGATIVE
POC NITRITE,URINE: NEGATIVE
POC PH, URINE: 6.5 PH
POC PROTEIN, URINE: NEGATIVE MG/DL
POC SPECIFIC GRAVITY, URINE: 1.01
POC UROBILINOGEN, URINE: 0.2 EU/DL
POTASSIUM SERPL-SCNC: 3.8 MMOL/L (ref 3.5–5.3)
PROT SERPL-MCNC: 6.6 G/DL (ref 6.4–8.2)
PSA SERPL-MCNC: 0.33 NG/ML
RBC # BLD AUTO: 5.24 X10*6/UL (ref 4.5–5.9)
SODIUM SERPL-SCNC: 138 MMOL/L (ref 136–145)
TIBC SERPL-MCNC: 465 UG/DL (ref 240–445)
TRIGL SERPL-MCNC: 203 MG/DL (ref 0–149)
TSH SERPL-ACNC: 3.11 MIU/L (ref 0.44–3.98)
UIBC SERPL-MCNC: 327 UG/DL (ref 110–370)
URATE SERPL-MCNC: 3.8 MG/DL (ref 4–7.5)
VIT B12 SERPL-MCNC: 402 PG/ML (ref 211–911)
VLDL: 41 MG/DL (ref 0–40)
WBC # BLD AUTO: 8.1 X10*3/UL (ref 4.4–11.3)

## 2025-05-23 PROCEDURE — BDTMS EXECUTIVE HEALTH BONE DENSITY TESTING /METRISCAN: Performed by: INTERNAL MEDICINE

## 2025-05-23 PROCEDURE — 83550 IRON BINDING TEST: CPT

## 2025-05-23 PROCEDURE — 85025 COMPLETE CBC W/AUTO DIFF WBC: CPT

## 2025-05-23 PROCEDURE — 3048F LDL-C <100 MG/DL: CPT | Performed by: INTERNAL MEDICINE

## 2025-05-23 PROCEDURE — 84402 ASSAY OF FREE TESTOSTERONE: CPT

## 2025-05-23 PROCEDURE — 3044F HG A1C LEVEL LT 7.0%: CPT | Performed by: INTERNAL MEDICINE

## 2025-05-23 PROCEDURE — 3078F DIAST BP <80 MM HG: CPT | Performed by: INTERNAL MEDICINE

## 2025-05-23 PROCEDURE — 93017 CV STRESS TEST TRACING ONLY: CPT

## 2025-05-23 PROCEDURE — EXAM4 EXAM 4: Performed by: INTERNAL MEDICINE

## 2025-05-23 PROCEDURE — BOMIX BODY MASS INDEX: Performed by: INTERNAL MEDICINE

## 2025-05-23 PROCEDURE — 84443 ASSAY THYROID STIM HORMONE: CPT

## 2025-05-23 PROCEDURE — 3074F SYST BP LT 130 MM HG: CPT | Performed by: INTERNAL MEDICINE

## 2025-05-23 PROCEDURE — NUTCO NUTRITION CONSULTATION: Performed by: DIETITIAN, REGISTERED

## 2025-05-23 PROCEDURE — 82728 ASSAY OF FERRITIN: CPT

## 2025-05-23 PROCEDURE — 83735 ASSAY OF MAGNESIUM: CPT

## 2025-05-23 PROCEDURE — TIVIS TITMUS VISION: Performed by: INTERNAL MEDICINE

## 2025-05-23 PROCEDURE — 83540 ASSAY OF IRON: CPT

## 2025-05-23 PROCEDURE — 84550 ASSAY OF BLOOD/URIC ACID: CPT

## 2025-05-23 PROCEDURE — AUDIO AUDIO HEARING TEST: Performed by: INTERNAL MEDICINE

## 2025-05-23 PROCEDURE — 3008F BODY MASS INDEX DOCD: CPT | Performed by: INTERNAL MEDICINE

## 2025-05-23 PROCEDURE — 82306 VITAMIN D 25 HYDROXY: CPT

## 2025-05-23 PROCEDURE — 1036F TOBACCO NON-USER: CPT | Performed by: INTERNAL MEDICINE

## 2025-05-23 PROCEDURE — 80061 LIPID PANEL: CPT

## 2025-05-23 PROCEDURE — 82270 OCCULT BLOOD FECES: CPT | Performed by: INTERNAL MEDICINE

## 2025-05-23 PROCEDURE — 81003 URINALYSIS AUTO W/O SCOPE: CPT | Performed by: INTERNAL MEDICINE

## 2025-05-23 PROCEDURE — 80053 COMPREHEN METABOLIC PANEL: CPT

## 2025-05-23 PROCEDURE — 71046 X-RAY EXAM CHEST 2 VIEWS: CPT

## 2025-05-23 ASSESSMENT — ENCOUNTER SYMPTOMS
CHEST TIGHTNESS: 0
GASTROINTESTINAL NEGATIVE: 1
SORE THROAT: 0
HEMATURIA: 0
ABDOMINAL PAIN: 0
ADENOPATHY: 0
FEVER: 0
JOINT SWELLING: 0
SINUS PRESSURE: 0
NUMBNESS: 0
WHEEZING: 0
APNEA: 0
DYSURIA: 0
NERVOUS/ANXIOUS: 0
CONSTIPATION: 0
APPETITE CHANGE: 0
POLYDIPSIA: 0
EYE PAIN: 0
DIARRHEA: 0
ARTHRALGIAS: 0
VOMITING: 0
DIFFICULTY URINATING: 0
COUGH: 0
CONFUSION: 0
TREMORS: 0
BRUISES/BLEEDS EASILY: 0
SLEEP DISTURBANCE: 0
FLANK PAIN: 0
FATIGUE: 0
SHORTNESS OF BREATH: 0
PALPITATIONS: 0
BLOOD IN STOOL: 0
LIGHT-HEADEDNESS: 1
AGITATION: 0
MYALGIAS: 0
HEADACHES: 1
HEMATOLOGIC/LYMPHATIC NEGATIVE: 1
FREQUENCY: 0
ALLERGIC/IMMUNOLOGIC NEGATIVE: 1
BACK PAIN: 0
DIZZINESS: 0
EYE REDNESS: 0
NAUSEA: 0

## 2025-05-23 ASSESSMENT — PAIN SCALES - GENERAL: PAINLEVEL_OUTOF10: 2
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
## 2025-05-27 DIAGNOSIS — S06.0X9A CONCUSSION WITH LOSS OF CONSCIOUSNESS, INITIAL ENCOUNTER: Primary | ICD-10-CM

## 2025-05-28 DIAGNOSIS — S09.90XA INJURY OF HEAD, INITIAL ENCOUNTER: Primary | ICD-10-CM

## 2025-05-28 DIAGNOSIS — S06.0X9A CONCUSSION WITH LOSS OF CONSCIOUSNESS, INITIAL ENCOUNTER: ICD-10-CM

## 2025-05-28 LAB
TESTOSTERONE FREE (CHAN): 65.7 PG/ML (ref 35–155)
TESTOSTERONE,TOTAL,LC-MS/MS: 421 NG/DL (ref 250–1100)

## 2025-05-28 ASSESSMENT — ENCOUNTER SYMPTOMS: FATIGUE: 1

## 2025-05-29 DIAGNOSIS — I10 HYPERTENSION, UNSPECIFIED TYPE: Primary | ICD-10-CM

## 2025-05-29 RX ORDER — SPIRONOLACTONE 25 MG/1
25 TABLET ORAL DAILY
Qty: 90 TABLET | Refills: 3 | Status: SHIPPED | OUTPATIENT
Start: 2025-05-29 | End: 2026-05-29

## 2025-06-08 PROCEDURE — RXMED WILLOW AMBULATORY MEDICATION CHARGE

## 2025-06-09 DIAGNOSIS — R45.89 ANXIETY ABOUT TREATMENT: ICD-10-CM

## 2025-06-09 DIAGNOSIS — R45.89 ANXIETY ABOUT TREATMENT: Primary | ICD-10-CM

## 2025-06-09 RX ORDER — DIAZEPAM 5 MG/1
5 TABLET ORAL ONCE AS NEEDED
Qty: 2 TABLET | Refills: 0 | Status: SHIPPED | OUTPATIENT
Start: 2025-06-09

## 2025-06-09 RX ORDER — DIAZEPAM 5 MG/1
5 TABLET ORAL ONCE AS NEEDED
Qty: 2 TABLET | Refills: 0 | Status: SHIPPED | OUTPATIENT
Start: 2025-06-09 | End: 2025-06-09 | Stop reason: SDUPTHER

## 2025-06-10 ENCOUNTER — HOSPITAL ENCOUNTER (OUTPATIENT)
Dept: RADIOLOGY | Facility: CLINIC | Age: 60
Discharge: HOME | End: 2025-06-10
Payer: COMMERCIAL

## 2025-06-10 DIAGNOSIS — S09.90XA INJURY OF HEAD, INITIAL ENCOUNTER: ICD-10-CM

## 2025-06-10 DIAGNOSIS — S06.0X9A CONCUSSION WITH LOSS OF CONSCIOUSNESS, INITIAL ENCOUNTER: ICD-10-CM

## 2025-06-10 PROCEDURE — 70551 MRI BRAIN STEM W/O DYE: CPT

## 2025-06-10 PROCEDURE — 70551 MRI BRAIN STEM W/O DYE: CPT | Performed by: RADIOLOGY

## 2025-06-11 ENCOUNTER — PHARMACY VISIT (OUTPATIENT)
Dept: PHARMACY | Facility: CLINIC | Age: 60
End: 2025-06-11
Payer: COMMERCIAL

## 2025-06-12 ENCOUNTER — APPOINTMENT (OUTPATIENT)
Dept: PRIMARY CARE | Facility: CLINIC | Age: 60
End: 2025-06-12
Payer: COMMERCIAL

## 2025-06-12 VITALS
DIASTOLIC BLOOD PRESSURE: 84 MMHG | RESPIRATION RATE: 18 BRPM | OXYGEN SATURATION: 96 % | HEART RATE: 79 BPM | SYSTOLIC BLOOD PRESSURE: 125 MMHG

## 2025-06-12 DIAGNOSIS — S06.0X9A CONCUSSION WITH LOSS OF CONSCIOUSNESS, INITIAL ENCOUNTER: ICD-10-CM

## 2025-06-12 DIAGNOSIS — F07.81 POST CONCUSSION SYNDROME: Primary | ICD-10-CM

## 2025-06-12 PROCEDURE — 3048F LDL-C <100 MG/DL: CPT | Performed by: FAMILY MEDICINE

## 2025-06-12 PROCEDURE — 3044F HG A1C LEVEL LT 7.0%: CPT | Performed by: FAMILY MEDICINE

## 2025-06-12 PROCEDURE — 3074F SYST BP LT 130 MM HG: CPT | Performed by: FAMILY MEDICINE

## 2025-06-12 PROCEDURE — 99203 OFFICE O/P NEW LOW 30 MIN: CPT | Performed by: FAMILY MEDICINE

## 2025-06-12 PROCEDURE — 3079F DIAST BP 80-89 MM HG: CPT | Performed by: FAMILY MEDICINE
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
## 2025-06-13 PROBLEM — I50.30 (HFPEF) HEART FAILURE WITH PRESERVED EJECTION FRACTION: Status: RESOLVED | Noted: 2023-09-26 | Resolved: 2025-06-13

## 2025-06-25 PROCEDURE — RXMED WILLOW AMBULATORY MEDICATION CHARGE

## 2025-06-26 ENCOUNTER — PHARMACY VISIT (OUTPATIENT)
Dept: PHARMACY | Facility: CLINIC | Age: 60
End: 2025-06-26
Payer: COMMERCIAL

## 2025-07-10 ENCOUNTER — EVALUATION (OUTPATIENT)
Dept: PHYSICAL THERAPY | Facility: CLINIC | Age: 60
End: 2025-07-10
Payer: COMMERCIAL

## 2025-07-10 DIAGNOSIS — F07.81 POST CONCUSSION SYNDROME: Primary | ICD-10-CM

## 2025-07-10 PROCEDURE — 97110 THERAPEUTIC EXERCISES: CPT | Mod: GP | Performed by: PHYSICAL THERAPIST

## 2025-07-10 PROCEDURE — 97162 PT EVAL MOD COMPLEX 30 MIN: CPT | Mod: GP | Performed by: PHYSICAL THERAPIST

## 2025-07-10 SDOH — ECONOMIC STABILITY: FOOD INSECURITY: WITHIN THE PAST 12 MONTHS, YOU WORRIED THAT YOUR FOOD WOULD RUN OUT BEFORE YOU GOT MONEY TO BUY MORE.: NEVER TRUE

## 2025-07-10 SDOH — ECONOMIC STABILITY: FOOD INSECURITY: WITHIN THE PAST 12 MONTHS, THE FOOD YOU BOUGHT JUST DIDN'T LAST AND YOU DIDN'T HAVE MONEY TO GET MORE.: NEVER TRUE

## 2025-07-10 ASSESSMENT — LIFESTYLE VARIABLES
HOW OFTEN DO YOU HAVE SIX OR MORE DRINKS ON ONE OCCASION: NEVER
HOW MANY STANDARD DRINKS CONTAINING ALCOHOL DO YOU HAVE ON A TYPICAL DAY: PATIENT DOES NOT DRINK
AUDIT-C TOTAL SCORE: 0
HOW OFTEN DO YOU HAVE A DRINK CONTAINING ALCOHOL: NEVER
SKIP TO QUESTIONS 9-10: 1

## 2025-07-10 ASSESSMENT — ENCOUNTER SYMPTOMS
LOSS OF SENSATION IN FEET: 0
OCCASIONAL FEELINGS OF UNSTEADINESS: 0
DEPRESSION: 0

## 2025-07-10 ASSESSMENT — PATIENT HEALTH QUESTIONNAIRE - PHQ9
SUM OF ALL RESPONSES TO PHQ9 QUESTIONS 1 AND 2: 0
1. LITTLE INTEREST OR PLEASURE IN DOING THINGS: NOT AT ALL
2. FEELING DOWN, DEPRESSED OR HOPELESS: NOT AT ALL

## 2025-07-10 ASSESSMENT — PAIN - FUNCTIONAL ASSESSMENT: PAIN_FUNCTIONAL_ASSESSMENT: 0-10

## 2025-07-10 ASSESSMENT — PAIN SCALES - GENERAL: PAINLEVEL_OUTOF10: 5 - MODERATE PAIN
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
## 2025-07-14 ENCOUNTER — TREATMENT (OUTPATIENT)
Dept: PHYSICAL THERAPY | Facility: CLINIC | Age: 60
End: 2025-07-14
Payer: COMMERCIAL

## 2025-07-14 DIAGNOSIS — F07.81 POST CONCUSSION SYNDROME: ICD-10-CM

## 2025-07-14 PROCEDURE — 97140 MANUAL THERAPY 1/> REGIONS: CPT | Mod: GP | Performed by: PHYSICAL THERAPIST

## 2025-07-14 PROCEDURE — 97112 NEUROMUSCULAR REEDUCATION: CPT | Mod: GP | Performed by: PHYSICAL THERAPIST

## 2025-07-14 PROCEDURE — 97110 THERAPEUTIC EXERCISES: CPT | Mod: GP | Performed by: PHYSICAL THERAPIST

## 2025-07-14 ASSESSMENT — PAIN - FUNCTIONAL ASSESSMENT: PAIN_FUNCTIONAL_ASSESSMENT: 0-10

## 2025-07-14 ASSESSMENT — PAIN SCALES - GENERAL: PAINLEVEL_OUTOF10: 5 - MODERATE PAIN

## 2025-07-14 NOTE — PROGRESS NOTES
Physical Therapy    Physical Therapy Treatment    Patient Name: Jimmie Dodd  MRN: [id number]  Today's Date: 7/14/2025  Time Calculation  Start Time: 1140  Stop Time: 1220  Time Calculation (min): 40 min     PT Therapeutic Procedures Time Entry  Therapeutic Exercise Time Entry: 15  Neuromuscular Re-Education Time Entry: 15  Manual Therapy Time Entry: 10                 Payor: [employer] / Plan: [employer] / Product Type: *No Product type* /     Reason for Referral: Post Consussion  Referred By: Luis Cisneros  General Comment: Visit 2 of 30    Current Problem    Problem List Items Addressed This Visit           ICD-10-CM    Post concussion syndrome F07.81      Subjective   Pt notes he started back to walking but feel she may have overdone it as he had some increased L leg pain aand soreness. Feels he didn't sleep well last night and feels foggy today.   Compliance with HEP-yes    Precautions  Precautions  STEADI Fall Risk Score (The score of 4 or more indicates an increased risk of falling): no changes  Precautions Comment: none    Pain  Pain Assessment: 0-10  0-10 (Numeric) Pain Score: 5 - Moderate pain  Pain Location: Neck  Pain Orientation: Right    Objective   PCS score 64    Treatments:  There-ex:  TM 1.8 mph x 5 min  Nu step x x 5 min  UBE x 5 min    Neuro re-ed:  Airex march x 2 min   Airex tandem x 1 min ea  Airex step up forward 10 x ea B LE  Airex step up lateral 10 x ea B LE     Manual Tx:  Manual traction c-spine, B UT and lev scap stretch manually.   Informed consent given on manual treatment. Pt given opportunity to cease treatment at any time. Educated pt on expected soreness, possible ecchymosis. Pt voiced understanding  No adverse effects were noted post tx.      Current HEP:  Access Code: [email or web address]  URL: [email or web address]  Date: 07/10/2025  Prepared by: ABISAI Cruz    Exercises  - Seated Horizontal Saccades  - 2 x daily - 7 x weekly - 1 sets - 3 reps - 30 sec  hold  - Seated Vertical Saccades  - 2 x daily - 7 x weekly - 1 sets - 3 reps - 30 sec hold  - Seated Gaze Stabilization with Head Rotation  - 2 x daily - 7 x weekly - 1 sets - 3 reps - 30 sec hold  - Seated Gaze Stabilization with Head Nod  - 2 x daily - 7 x weekly - 1 sets - 3 reps - 30 sec hold  - Seated Cervical Retraction  - 2 x daily - 7 x weekly - 2 sets - 10 reps  - Seated Cervical Sidebending Stretch  - 2 x daily - 7 x weekly - 1 sets - 3 reps - 15 sec hold  - Seated Levator Scapulae Stretch  - 2 x daily - 7 x weekly - 1 sets - 3 reps - 15 sec hold    Assessment:   Pt overall did well today with program with only nausea reported with balance activity. Otherwise did well with no issues. Recommended to continue with current HEP as directed.     Plan:   Continue to progress program as tolerated.     Goals:  Active       PT Problem       Reduce pain at worst to 3/10 with all prior activity.        Start:  07/10/25    Expected End:  08/09/25            Pt to sit with good posture approx 50-75% of the time.        Start:  07/10/25    Expected End:  08/09/25            Reduce pain at worst to 1/10 with all prior activity.        Start:  07/10/25    Expected End:  09/08/25            Reduce NATIVIDAD test score by 10 points to display improved balance        Start:  07/10/25    Expected End:  08/09/25            Reduce PCS score to <20 points to display overall improved symptoms and function.        Start:  07/10/25    Expected End:  09/08/25            Pt to be independent with a HEP for self management.        Start:  07/10/25    Expected End:  09/08/25

## 2025-07-15 ENCOUNTER — HOSPITAL ENCOUNTER (OUTPATIENT)
Dept: RADIOLOGY | Facility: HOSPITAL | Age: 60
End: 2025-07-15
Payer: COMMERCIAL

## 2025-07-17 ENCOUNTER — APPOINTMENT (OUTPATIENT)
Dept: PHYSICAL THERAPY | Facility: CLINIC | Age: 60
End: 2025-07-17
Payer: COMMERCIAL

## 2025-07-17 ENCOUNTER — APPOINTMENT (OUTPATIENT)
Dept: PRIMARY CARE | Facility: CLINIC | Age: 60
End: 2025-07-17
Payer: COMMERCIAL

## 2025-07-17 VITALS
HEART RATE: 80 BPM | OXYGEN SATURATION: 97 % | SYSTOLIC BLOOD PRESSURE: 138 MMHG | RESPIRATION RATE: 18 BRPM | DIASTOLIC BLOOD PRESSURE: 77 MMHG

## 2025-07-17 DIAGNOSIS — F07.81 POST CONCUSSION SYNDROME: Primary | ICD-10-CM

## 2025-07-17 PROCEDURE — 99214 OFFICE O/P EST MOD 30 MIN: CPT | Performed by: FAMILY MEDICINE

## 2025-07-17 PROCEDURE — 3075F SYST BP GE 130 - 139MM HG: CPT | Performed by: FAMILY MEDICINE

## 2025-07-17 PROCEDURE — 3078F DIAST BP <80 MM HG: CPT | Performed by: FAMILY MEDICINE

## 2025-07-17 NOTE — LETTER
July 17, 2025     Patient: Jimmie Dodd   YOB: 1965   Date of Visit: 7/17/2025       To Whom It May Concern:    Jimmie Dodd was seen in my clinic on 7/17/2025 at 10:30 am. Please excuse Jimmie for his absence from work on this day to make the appointment.    Due to continued concussion symptoms, I think Jimmie would benefit from a week retreat that has decreased stimuli for complete rest/recovery.     If you have any questions or concerns, please don't hesitate to call.         Sincerely,         Luis Cisneros, DO        CC: No Recipients
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
## 2025-07-24 PROCEDURE — RXMED WILLOW AMBULATORY MEDICATION CHARGE

## 2025-07-25 ENCOUNTER — PHARMACY VISIT (OUTPATIENT)
Dept: PHARMACY | Facility: CLINIC | Age: 60
End: 2025-07-25
Payer: COMMERCIAL

## 2025-08-04 ENCOUNTER — TREATMENT (OUTPATIENT)
Dept: PHYSICAL THERAPY | Facility: CLINIC | Age: 60
End: 2025-08-04
Payer: COMMERCIAL

## 2025-08-04 DIAGNOSIS — F07.81 POST CONCUSSION SYNDROME: Primary | ICD-10-CM

## 2025-08-04 PROCEDURE — 97140 MANUAL THERAPY 1/> REGIONS: CPT | Mod: GP | Performed by: PHYSICAL THERAPIST

## 2025-08-04 PROCEDURE — 97112 NEUROMUSCULAR REEDUCATION: CPT | Mod: GP | Performed by: PHYSICAL THERAPIST

## 2025-08-04 PROCEDURE — 97110 THERAPEUTIC EXERCISES: CPT | Mod: GP | Performed by: PHYSICAL THERAPIST

## 2025-08-04 ASSESSMENT — PAIN - FUNCTIONAL ASSESSMENT: PAIN_FUNCTIONAL_ASSESSMENT: 0-10

## 2025-08-04 ASSESSMENT — PAIN SCALES - GENERAL: PAINLEVEL_OUTOF10: 5 - MODERATE PAIN

## 2025-08-04 NOTE — PROGRESS NOTES
Physical Therapy    Physical Therapy Treatment    Patient Name: Jimmie Dodd  MRN: [id number]  Today's Date: 8/4/2025  Time Calculation  Start Time: 1535  Stop Time: 1615  Time Calculation (min): 40 min     PT Therapeutic Procedures Time Entry  Therapeutic Exercise Time Entry: 15  Neuromuscular Re-Education Time Entry: 15  Manual Therapy Time Entry: 10                 Payor: [employer] / Plan: [employer] / Product Type: *No Product type* /     Reason for Referral: Post Consussion  Referred By: Luis Cisneros  General Comment: Visit 3 of 30    Current Problem    Problem List Items Addressed This Visit           ICD-10-CM    Post concussion syndrome - Primary F07.81        Subjective   Pt notes overall feeling still dizziness at times, sensitivity to light and headaches mostly but still other post concussion symptoms.   Compliance with HEP-yes    Precautions  Precautions  STEADI Fall Risk Score (The score of 4 or more indicates an increased risk of falling): no changes  Precautions Comment: none    Pain  Pain Assessment: 0-10  0-10 (Numeric) Pain Score: 5 - Moderate pain  Pain Location: Neck  Pain Orientation: Right    Objective   PCS score 62    Treatments:  There-ex:  TM 1.8 mph x 5 min  Nu step x x 5 min  UBE x 5 min    Neuro re-ed:  Airex march x 2 min   Airex tandem x 1 min ea  Airex step up forward 10 x ea B LE  Airex step up lateral 10 x ea B LE     Manual Tx:  Manual traction c-spine, B UT and lev scap stretch manually.   Informed consent given on manual treatment. Pt given opportunity to cease treatment at any time. Educated pt on expected soreness, possible ecchymosis. Pt voiced understanding  No adverse effects were noted post tx.      Current HEP:  Access Code: [email or web address]  URL: [email or web address]  Date: 07/10/2025  Prepared by: ABISAI Cruz    Exercises  - Seated Horizontal Saccades  - 2 x daily - 7 x weekly - 1 sets - 3 reps - 30 sec hold  - Seated Vertical Saccades  - 2  x daily - 7 x weekly - 1 sets - 3 reps - 30 sec hold  - Seated Gaze Stabilization with Head Rotation  - 2 x daily - 7 x weekly - 1 sets - 3 reps - 30 sec hold  - Seated Gaze Stabilization with Head Nod  - 2 x daily - 7 x weekly - 1 sets - 3 reps - 30 sec hold  - Seated Cervical Retraction  - 2 x daily - 7 x weekly - 2 sets - 10 reps  - Seated Cervical Sidebending Stretch  - 2 x daily - 7 x weekly - 1 sets - 3 reps - 15 sec hold  - Seated Levator Scapulae Stretch  - 2 x daily - 7 x weekly - 1 sets - 3 reps - 15 sec hold0    Assessment:   Overal noted less headache and neck pain post manul treatment again today. Pt did have more nausea with activity performed today though. Pt with overall decrease in PCS score but still significant symptoms present.     Plan:   Continue to progress strength, cervical ROM and posture with improving cardio levels.     Goals:  Active       PT Problem       Reduce pain at worst to 3/10 with all prior activity.  (Progressing)       Start:  07/10/25    Expected End:  08/09/25            Pt to sit with good posture approx 50-75% of the time.  (Progressing)       Start:  07/10/25    Expected End:  08/09/25            Reduce pain at worst to 1/10 with all prior activity.        Start:  07/10/25    Expected End:  09/08/25            Reduce NATIVIDAD test score by 10 points to display improved balance        Start:  07/10/25    Expected End:  08/09/25            Reduce PCS score to <20 points to display overall improved symptoms and function.        Start:  07/10/25    Expected End:  09/08/25            Pt to be independent with a HEP for self management.        Start:  07/10/25    Expected End:  09/08/25

## 2025-08-06 ENCOUNTER — TREATMENT (OUTPATIENT)
Dept: PHYSICAL THERAPY | Facility: CLINIC | Age: 60
End: 2025-08-06
Payer: COMMERCIAL

## 2025-08-06 DIAGNOSIS — F07.81 POST CONCUSSION SYNDROME: ICD-10-CM

## 2025-08-06 PROCEDURE — 97110 THERAPEUTIC EXERCISES: CPT | Mod: GP | Performed by: PHYSICAL THERAPIST

## 2025-08-06 PROCEDURE — 97112 NEUROMUSCULAR REEDUCATION: CPT | Mod: GP | Performed by: PHYSICAL THERAPIST

## 2025-08-06 PROCEDURE — 97140 MANUAL THERAPY 1/> REGIONS: CPT | Mod: GP | Performed by: PHYSICAL THERAPIST

## 2025-08-06 ASSESSMENT — PAIN SCALES - GENERAL: PAINLEVEL_OUTOF10: 4

## 2025-08-06 ASSESSMENT — PAIN - FUNCTIONAL ASSESSMENT: PAIN_FUNCTIONAL_ASSESSMENT: 0-10

## 2025-08-06 NOTE — PROGRESS NOTES
"Physical Therapy    Physical Therapy Treatment    Patient Name: Jimmie Dodd  MRN: [id number]  Today's Date: 8/6/2025  Time Calculation  Start Time: 1700  Stop Time: 1745  Time Calculation (min): 45 min     PT Therapeutic Procedures Time Entry  Therapeutic Exercise Time Entry: 15  Neuromuscular Re-Education Time Entry: 15  Manual Therapy Time Entry: 15                 Payor: [employer] / Plan: [employer] / Product Type: *No Product type* /     Reason for Referral: Post Consussion  Referred By: Luis Cisneros  General Comment: Visit 4 of 30    Current Problem    Problem List Items Addressed This Visit           ICD-10-CM    Post concussion syndrome F07.81        Subjective   Pt overall notes still same symptoms but slightly better.   Compliance with HEP-yes    Precautions  Precautions  STEADI Fall Risk Score (The score of 4 or more indicates an increased risk of falling): no changes  Precautions Comment: none    Pain  Pain Assessment: 0-10  0-10 (Numeric) Pain Score: 4  Pain Location: Neck  Pain Orientation: Right    Objective   PSC score 59    Treatments:  There-ex:  TM 1.8 mph x 5 min  Nu step x x 5 min  UBE x 5 min    Neuro re-ed:  Airex march x 2 min   Airex tandem x 1 min ea  Airex step up forward 10 x ea B LE  Airex step up lateral 10 x ea B LE   Airex head turns R/L 30\" x 3   Airex head nods up/down 30\" x 3    Manual Tx:  Manual traction c-spine, B UT and lev scap stretch manually.   Informed consent given on manual treatment. Pt given opportunity to cease treatment at any time. Educated pt on expected soreness, possible ecchymosis. Pt voiced understanding  No adverse effects were noted post tx.      Current HEP:  Access Code: [email or web address]  URL: [email or web address]  Date: 07/10/2025  Prepared by: ABISAI Cruz    Exercises  - Seated Horizontal Saccades  - 2 x daily - 7 x weekly - 1 sets - 3 reps - 30 sec hold  - Seated Vertical Saccades  - 2 x daily - 7 x weekly - 1 sets - 3 reps " - 30 sec hold  - Seated Gaze Stabilization with Head Rotation  - 2 x daily - 7 x weekly - 1 sets - 3 reps - 30 sec hold  - Seated Gaze Stabilization with Head Nod  - 2 x daily - 7 x weekly - 1 sets - 3 reps - 30 sec hold  - Seated Cervical Retraction  - 2 x daily - 7 x weekly - 2 sets - 10 reps  - Seated Cervical Sidebending Stretch  - 2 x daily - 7 x weekly - 1 sets - 3 reps - 15 sec hold  - Seated Levator Scapulae Stretch  - 2 x daily - 7 x weekly - 1 sets - 3 reps - 15 sec hold0     Assessment:   Pt overall tolerated treatment well today and is displaying an overall slower reduction in his concussion symptoms. Pt overall noted less neck pain post manual tx.        Plan:   Continue to progress concussion program with postural strengthening.     Goals:  Active       PT Problem       Reduce pain at worst to 3/10 with all prior activity.  (Progressing)       Start:  07/10/25    Expected End:  08/09/25            Pt to sit with good posture approx 50-75% of the time.  (Progressing)       Start:  07/10/25    Expected End:  08/09/25            Reduce pain at worst to 1/10 with all prior activity.        Start:  07/10/25    Expected End:  09/08/25            Reduce NATIVIDAD test score by 10 points to display improved balance        Start:  07/10/25    Expected End:  08/09/25            Reduce PCS score to <20 points to display overall improved symptoms and function.        Start:  07/10/25    Expected End:  09/08/25            Pt to be independent with a HEP for self management.        Start:  07/10/25    Expected End:  09/08/25

## 2025-08-15 PROCEDURE — RXMED WILLOW AMBULATORY MEDICATION CHARGE

## 2025-08-18 ENCOUNTER — APPOINTMENT (OUTPATIENT)
Dept: PHYSICAL THERAPY | Facility: CLINIC | Age: 60
End: 2025-08-18
Payer: COMMERCIAL

## 2025-08-19 ENCOUNTER — PHARMACY VISIT (OUTPATIENT)
Dept: PHARMACY | Facility: CLINIC | Age: 60
End: 2025-08-19
Payer: COMMERCIAL

## 2025-08-20 ENCOUNTER — APPOINTMENT (OUTPATIENT)
Dept: PRIMARY CARE | Facility: CLINIC | Age: 60
End: 2025-08-20
Payer: COMMERCIAL

## 2025-08-20 DIAGNOSIS — R45.89 ANXIETY ABOUT TREATMENT: ICD-10-CM

## 2025-08-20 RX ORDER — DIAZEPAM 5 MG/1
5 TABLET ORAL ONCE AS NEEDED
Qty: 2 TABLET | Refills: 0 | Status: SHIPPED | OUTPATIENT
Start: 2025-08-20

## 2025-08-21 ENCOUNTER — APPOINTMENT (OUTPATIENT)
Dept: PHYSICAL THERAPY | Facility: CLINIC | Age: 60
End: 2025-08-21
Payer: COMMERCIAL

## 2025-08-26 ENCOUNTER — TREATMENT (OUTPATIENT)
Dept: PHYSICAL THERAPY | Facility: CLINIC | Age: 60
End: 2025-08-26
Payer: COMMERCIAL

## 2025-08-26 DIAGNOSIS — F07.81 POST CONCUSSION SYNDROME: ICD-10-CM

## 2025-08-26 PROCEDURE — 97110 THERAPEUTIC EXERCISES: CPT | Mod: GP | Performed by: PHYSICAL THERAPIST

## 2025-08-26 PROCEDURE — 97140 MANUAL THERAPY 1/> REGIONS: CPT | Mod: GP | Performed by: PHYSICAL THERAPIST

## 2025-08-26 PROCEDURE — 97112 NEUROMUSCULAR REEDUCATION: CPT | Mod: GP | Performed by: PHYSICAL THERAPIST

## 2025-08-26 ASSESSMENT — PAIN SCALES - GENERAL: PAINLEVEL_OUTOF10: 4

## 2025-08-26 ASSESSMENT — PAIN - FUNCTIONAL ASSESSMENT: PAIN_FUNCTIONAL_ASSESSMENT: 0-10

## 2025-08-28 ENCOUNTER — PHARMACY VISIT (OUTPATIENT)
Dept: PHARMACY | Facility: CLINIC | Age: 60
End: 2025-08-28
Payer: COMMERCIAL

## 2025-08-28 ENCOUNTER — HOSPITAL ENCOUNTER (OUTPATIENT)
Dept: RADIOLOGY | Facility: HOSPITAL | Age: 60
Discharge: HOME | End: 2025-08-28
Payer: COMMERCIAL

## 2025-08-28 DIAGNOSIS — I21.9 MYOCARDIAL INFARCTION, UNSPECIFIED MI TYPE, UNSPECIFIED ARTERY (MULTI): ICD-10-CM

## 2025-08-28 PROCEDURE — 75561 CARDIAC MRI FOR MORPH W/DYE: CPT

## 2025-08-28 PROCEDURE — 2550000001 HC RX 255 CONTRASTS: Performed by: INTERNAL MEDICINE

## 2025-08-28 PROCEDURE — RXMED WILLOW AMBULATORY MEDICATION CHARGE

## 2025-08-28 PROCEDURE — A9575 INJ GADOTERATE MEGLUMI 0.1ML: HCPCS | Performed by: INTERNAL MEDICINE

## 2025-08-28 RX ORDER — GADOTERATE MEGLUMINE 376.9 MG/ML
40 INJECTION INTRAVENOUS
Status: COMPLETED | OUTPATIENT
Start: 2025-08-28 | End: 2025-08-28

## 2025-08-28 RX ADMIN — GADOTERATE MEGLUMINE 40 ML: 376.9 INJECTION INTRAVENOUS at 09:15

## 2025-09-05 ENCOUNTER — TELEPHONE (OUTPATIENT)
Dept: PRIMARY CARE | Facility: CLINIC | Age: 60
End: 2025-09-05
Payer: COMMERCIAL

## 2025-10-09 ENCOUNTER — APPOINTMENT (OUTPATIENT)
Dept: PRIMARY CARE | Facility: CLINIC | Age: 60
End: 2025-10-09
Payer: COMMERCIAL